# Patient Record
Sex: FEMALE | Race: WHITE | Employment: OTHER | URBAN - METROPOLITAN AREA
[De-identification: names, ages, dates, MRNs, and addresses within clinical notes are randomized per-mention and may not be internally consistent; named-entity substitution may affect disease eponyms.]

---

## 2017-01-15 ENCOUNTER — OFFICE VISIT (OUTPATIENT)
Dept: URGENT CARE | Facility: URGENT CARE | Age: 62
End: 2017-01-15
Payer: COMMERCIAL

## 2017-01-15 VITALS
TEMPERATURE: 98.3 F | WEIGHT: 180 LBS | BODY MASS INDEX: 29.81 KG/M2 | OXYGEN SATURATION: 92 % | HEART RATE: 109 BPM | DIASTOLIC BLOOD PRESSURE: 76 MMHG | SYSTOLIC BLOOD PRESSURE: 147 MMHG

## 2017-01-15 DIAGNOSIS — R82.90 NONSPECIFIC FINDING ON EXAMINATION OF URINE: ICD-10-CM

## 2017-01-15 DIAGNOSIS — R30.0 DYSURIA: Primary | ICD-10-CM

## 2017-01-15 DIAGNOSIS — N30.00 ACUTE CYSTITIS WITHOUT HEMATURIA: ICD-10-CM

## 2017-01-15 LAB
ALBUMIN UR-MCNC: 30 MG/DL
APPEARANCE UR: ABNORMAL
BACTERIA #/AREA URNS HPF: ABNORMAL /HPF
BILIRUB UR QL STRIP: NEGATIVE
COLOR UR AUTO: YELLOW
GLUCOSE UR STRIP-MCNC: NEGATIVE MG/DL
HGB UR QL STRIP: ABNORMAL
KETONES UR STRIP-MCNC: 15 MG/DL
LEUKOCYTE ESTERASE UR QL STRIP: ABNORMAL
NITRATE UR QL: NEGATIVE
NON-SQ EPI CELLS #/AREA URNS LPF: ABNORMAL /LPF
PH UR STRIP: 5.5 PH (ref 5–7)
RBC #/AREA URNS AUTO: ABNORMAL /HPF (ref 0–2)
SP GR UR STRIP: >1.03 (ref 1–1.03)
URN SPEC COLLECT METH UR: ABNORMAL
UROBILINOGEN UR STRIP-ACNC: 0.2 EU/DL (ref 0.2–1)
WBC #/AREA URNS AUTO: ABNORMAL /HPF (ref 0–2)

## 2017-01-15 PROCEDURE — 87086 URINE CULTURE/COLONY COUNT: CPT | Performed by: PHYSICIAN ASSISTANT

## 2017-01-15 PROCEDURE — 81001 URINALYSIS AUTO W/SCOPE: CPT | Performed by: PHYSICIAN ASSISTANT

## 2017-01-15 PROCEDURE — 87088 URINE BACTERIA CULTURE: CPT | Performed by: PHYSICIAN ASSISTANT

## 2017-01-15 PROCEDURE — 87186 SC STD MICRODIL/AGAR DIL: CPT | Performed by: PHYSICIAN ASSISTANT

## 2017-01-15 PROCEDURE — 99213 OFFICE O/P EST LOW 20 MIN: CPT | Performed by: PHYSICIAN ASSISTANT

## 2017-01-15 RX ORDER — CIPROFLOXACIN 500 MG/1
500 TABLET, FILM COATED ORAL 2 TIMES DAILY
Qty: 14 TABLET | Refills: 0 | Status: SHIPPED | OUTPATIENT
Start: 2017-01-15 | End: 2019-04-16

## 2017-01-15 NOTE — NURSING NOTE
"Chief Complaint   Patient presents with     Urinary Problem     Pt c/o urinary problem for one week.        Initial /76 mmHg  Pulse 109  Temp(Src) 98.3  F (36.8  C) (Oral)  Wt 180 lb (81.647 kg)  SpO2 92%  Breastfeeding? No Estimated body mass index is 29.81 kg/(m^2) as calculated from the following:    Height as of 12/14/16: 5' 5.16\" (1.655 m).    Weight as of this encounter: 180 lb (81.647 kg).  BP completed using cuff size: regular    Paola Klein CMA (AAMA)      "

## 2017-01-15 NOTE — PROGRESS NOTES
SUBJECTIVE:                                                    Carito Petty is a 61 year old female who presents to clinic today for the following health issues:    URINARY TRACT SYMPTOMS      Duration: one week ago. Sx were mild and have progressed. Sx worsened again last night.     Description  dysuria, frequency and urgency    Intensity:  moderate    Accompanying signs and symptoms:  Fever/chills: no   Flank pain no   Nausea and vomiting: no   Vaginal symptoms: none  Abdominal/Pelvic Pain: no     History  History of frequent UTI's: YES but it's been about a year since her last one.   History of kidney stones: no   Sexually Active: no   Possibility of pregnancy: No    Precipitating or alleviating factors: None    Therapies tried and outcome: cranberry juice, increased water intake Outcome: no relief.           Allergies   Allergen Reactions     Penicillins        Past Medical History   Diagnosis Date     Arthritis      PMDD (premenstrual dysphoric disorder)      depresion     Neuropathy (H)      extremities     Fibrocystic breast      Fibromyalgia      Seborrheic dermatitis 2008     face, scalp     Depressive disorder          Current Outpatient Prescriptions on File Prior to Visit:  gabapentin (NEURONTIN) 300 MG capsule TK 2 CS PO QD   venlafaxine (EFFEXOR) 37.5 MG tablet TK 1 T PO BID   ibuprofen (ADVIL,MOTRIN) 600 MG tablet Take 1 tablet (600 mg) by mouth every 6 hours as needed for moderate pain   Methocarbamol (ROBAXIN-750 PO) Take  by mouth. As needed   tramadol (ULTRAM) 50 MG tablet Take 50 mg by mouth every 6 hours as needed.   Omega-3 Fatty Acids (FISH OIL PO) Take  by mouth daily.   MULTI-VITAMIN OR TABS 1 TABLET DAILY     No current facility-administered medications on file prior to visit.    Social History   Substance Use Topics     Smoking status: Former Smoker -- 0.50 packs/day for 3 years     Types: Cigarettes     Start date: 06/01/1984     Quit date: 08/01/1987     Smokeless tobacco: Never  Used     Alcohol Use: Yes       ROS:  General: negative for fever  ABD: Denies abd pain  : as above    OBJECTIVE:  /76 mmHg  Pulse 109  Temp(Src) 98.3  F (36.8  C) (Oral)  Wt 180 lb (81.647 kg)  SpO2 92%  Breastfeeding? No   General:   awake, alert, and cooperative.  NAD.   Head: Normocephalic, atraumatic.  Eyes: Conjunctiva clear, non icteric.   ABD: soft, no tenderness to palpation , no rigidity, guarding or rebound . No CVAT  Neuro: Alert and oriented - normal speech.   Results for orders placed or performed in visit on 01/15/17   *UA reflex to Microscopic and Culture ([facility], [place] (except [place] and [place])   Result Value Ref Range    Color Urine Yellow     Appearance Urine Slightly Cloudy     Glucose Urine Negative NEG mg/dL    Bilirubin Urine Negative NEG    Ketones Urine 15 (A) NEG mg/dL    Specific Gravity Urine >1.030 1.003 - 1.035    Blood Urine Moderate (A) NEG    pH Urine 5.5 5.0 - 7.0 pH    Protein Albumin Urine 30 (A) NEG mg/dL    Urobilinogen Urine 0.2 0.2 - 1.0 EU/dL    Nitrite Urine Negative NEG    Leukocyte Esterase Urine Trace (A) NEG    Source Midstream Urine    Urine Microscopic   Result Value Ref Range    WBC Urine 25-50 (A) 0 - 2 /HPF    RBC Urine 25-50 (A) 0 - 2 /HPF    Squamous Epithelial /LPF Urine Few FEW /LPF    Bacteria Urine Many (A) NEG /HPF       ASSESSMENT:  Lower, uncomplicated urinary tract infection. Benign exam.      ICD-10-CM    1. Dysuria R30.0 *UA reflex to Microscopic and Culture ([facility], [place] (except [place] and [place])     Urine Microscopic   2. Nonspecific finding on examination of urine R82.90 Urine Culture Aerobic Bacterial   3. Acute cystitis without hematuria N30.00 ciprofloxacin (CIPRO) 500 MG tablet       PLAN:   As per ordered above.  Drink plenty of fluids.  Prevention and treatment of UTI's discussed. Follow up with primary care physician if not improving.  Advised about symptoms which might  lea more serious problems.      Manisha Castle PA-C

## 2017-01-17 LAB
BACTERIA SPEC CULT: ABNORMAL
MICRO REPORT STATUS: ABNORMAL
MICROORGANISM SPEC CULT: ABNORMAL
SPECIMEN SOURCE: ABNORMAL

## 2017-01-18 ENCOUNTER — OFFICE VISIT (OUTPATIENT)
Dept: DERMATOLOGY | Facility: CLINIC | Age: 62
End: 2017-01-18
Payer: COMMERCIAL

## 2017-01-18 DIAGNOSIS — L81.4 SOLAR LENTIGO: Primary | ICD-10-CM

## 2017-01-18 DIAGNOSIS — L82.0 INFLAMED SEBORRHEIC KERATOSIS: ICD-10-CM

## 2017-01-18 DIAGNOSIS — L82.1 SEBORRHEIC KERATOSIS: ICD-10-CM

## 2017-01-18 DIAGNOSIS — D22.5 MULTIPLE BENIGN NEVI OF UPPER AND LOWER EXTREMITIES, AND TRUNK: ICD-10-CM

## 2017-01-18 DIAGNOSIS — D22.60 MULTIPLE BENIGN NEVI OF UPPER AND LOWER EXTREMITIES, AND TRUNK: ICD-10-CM

## 2017-01-18 DIAGNOSIS — D22.70 MULTIPLE BENIGN NEVI OF UPPER AND LOWER EXTREMITIES, AND TRUNK: ICD-10-CM

## 2017-01-18 DIAGNOSIS — L57.0 ACTINIC KERATOSES: ICD-10-CM

## 2017-01-18 PROCEDURE — 17003 DESTRUCT PREMALG LES 2-14: CPT | Performed by: DERMATOLOGY

## 2017-01-18 PROCEDURE — 99214 OFFICE O/P EST MOD 30 MIN: CPT | Mod: 25 | Performed by: DERMATOLOGY

## 2017-01-18 PROCEDURE — 17110 DESTRUCTION B9 LES UP TO 14: CPT | Performed by: DERMATOLOGY

## 2017-01-18 PROCEDURE — 17000 DESTRUCT PREMALG LESION: CPT | Mod: 59 | Performed by: DERMATOLOGY

## 2017-01-18 NOTE — PATIENT INSTRUCTIONS
Cryotherapy    What is it?    Use of a very cold liquid, such as liquid nitrogen, to freeze and destroy abnormal skin cells that need to be removed    What should I expect?    Tenderness and redness    A small blister that might grow and fill with dark purple blood. There may be crusting.    More than one treatment may be needed if the lesions do not go away.    How do I care for the treated area?    Gently wash the area with your hands when bathing.    Use a thin layer of Vaseline to help with healing. You may use a Band-Aid.     The area should heal within 7-10 days and may leave behind a pink or lighter color.     Do not use an antibiotic or Neosporin ointment.     You may take acetaminophen (Tylenol) for pain.     Call your Doctor if you have:    Severe pain    Signs of infection (warmth, redness, cloudy yellow drainage, and or a bad smell)    Questions or concerns    Who should I call with questions?       [facility]: [phone]       [facility]: [phone]       For urgent needs outside of business hours call the [facility] at [phone]        and ask for the dermatology resident on call

## 2017-01-18 NOTE — PROGRESS NOTES
"[facility] Dermatology Note      Dermatology Problem List:  1. Actinic keratosis X2  2. Inflamed Seborrheic keratosis X2  3. Non irritating Seborrheic keratosis X4  -s/p treated with Cryotherapy     Encounter Date: Jan 18, 2017    CC:  Chief Complaint   Patient presents with     Skin Check     no areas of concern     Skin Tags     around eyes     Derm Problem     occassional skin rash on upper body         History of Present Illness:  Ms. Carito Petty is a 61 year old female who presents for evaluation of \"warts/growth\" around eyes and occasional skin rash. Today, the pt reports she has crusty spots around her eyes that are occasional pruritic. Sometimes when she rubs her eyes it would irritate the bumps. In addition, pt is concerned for an itchy rash mainly on upper body. She says they come and go and she doesn't have any lesions right now. When the rash is present it is dry and scaly in appearance. She has a hx of fibromyalgia wondered whether it is connected to the rash. She finds that rash slowly improves with use of a thick moisturizer. Pt is otherwise feeling well with no additional skin concerns.     Past Medical History:   Patient Active Problem List   Diagnosis     CARDIOVASCULAR SCREENING; LDL GOAL LESS THAN 160     Advanced directives, counseling/discussion     Urgency incontinence     Primary osteoarthritis of both hands     Fibromyalgia     Overweight (BMI 25.0-29.9)     Past Medical History   Diagnosis Date     Arthritis      PMDD (premenstrual dysphoric disorder)      depresion     Neuropathy (H)      extremities     Fibrocystic breast      Fibromyalgia      Seborrheic dermatitis 2008     face, scalp     Depressive disorder      Past Surgical History   Procedure Laterality Date     Head & neck surgery       2003 herniated disc and bone spurs     Colonoscopy  12/5/2013     Procedure: COLONOSCOPY;  colonoscopy screening;  Surgeon: Dennis Minaya MD;  Location:  OR "       Social History:  The patient is retired. The patient admits to use of tanning beds. She drinks 3-6 alcoholic beverages a week and is a nonsmoker.    Family History:  There is no family history of skin cancer. There is a family hx of Hay Fever.    Medications:  Current Outpatient Prescriptions   Medication Sig Dispense Refill     ciprofloxacin (CIPRO) 500 MG tablet Take 1 tablet (500 mg) by mouth 2 times daily 14 tablet 0     gabapentin (NEURONTIN) 300 MG capsule TK 2 CS PO QD  1     venlafaxine (EFFEXOR) 37.5 MG tablet TK 1 T PO BID  11     ibuprofen (ADVIL,MOTRIN) 600 MG tablet Take 1 tablet (600 mg) by mouth every 6 hours as needed for moderate pain 30 tablet 3     Methocarbamol (ROBAXIN-750 PO) Take  by mouth. As needed       tramadol (ULTRAM) 50 MG tablet Take 50 mg by mouth every 6 hours as needed.       Omega-3 Fatty Acids (FISH OIL PO) Take  by mouth daily.       MULTI-VITAMIN OR TABS 1 TABLET DAILY         Allergies   Allergen Reactions     Penicillins        Review of Systems:  -Skin/Heme New Pt: The patient denies frequent sun exposure. The patient denies excessive scarring or problems healing except as per HPI. The patient denies excessive bleeding. The pt admits to blistering sunburns.   -Constitutional: The patient is otherwise feeling well.     Physical exam:  Vitals: There were no vitals taken for this visit.  GEN: This is a well-nourished, well developed female in no acute distress  NEURO: Alert and oriented  PSYCH: in a pleasant mood, appropriate affect  SKIN: Total skin excluding the undergarment areas was performed. The exam included the head/face, neck, both arms, chest, back, abdomen, both legs, digits and/or nails.   -Poorly demarcated eczematous appearing papule on left popliteal fossa and left arm  -There are a number of both inflamed and non inflamed SK on the upper and lower eyelid with the more inflamed ones having a red rim on the lower eyelid  - There are an erythematous macule  with overyling adherent scale on the face x 2.  -Multiple regular brown pigmented macules and papules are identified on the body, scalp.   -Scattered brown macules on sun exposed areas.  -No other lesions of concern on areas examined.       Impression/Plan:  1. Actinic keratosis X2: cryotherapy    Sun precaution was advised including the use of sun screens of SPF 30 or higher, sun protective clothing, and avoidance of tanning beds.  2. Inflamed Seborrheic keratosis X2 on the b/l lower eyelids. cryo  3. Non-inflamed Seborrheic keratosis X4 on b/l eyelids: cryo.    Cryotherapy procedure note: After verbal consent and discussion of risks and benefits including but no limited to dyspigmentation/scar, blister, and pain, 2 inflamed SK and 4 non inflamed SK and 2 AK for a total of 8 lesions were treated with 1-2mm freeze border for 2 cycles with liquid nitrogen. Post cryotherapy instructions were provided.     4. Multiple clinically benign nevi scattered on the body    Benign nature was discussed. No further intervention required at this time.   5. Solar lentigines    Sun precaution was advised including the use of sun screens of SPF 30 or higher, sun protective clothing, and avoidance of tanning beds.      Follow-up in 2 years, earlier for new or changing lesions.       Staff Involved:  Scribe/Staff      Scribe Disclosure:   I, Yves Hastings, am serving as a scribe to document services personally performed by Dr. James To, based on data collection and the provider's statements to me.     Provider Disclosure:   I have reviewed the documentation recorded by the scribe and have edited it as needed. I have personally performed the services documented here and the documentation accurately represents those services and the decisions made by me.     James To MD, MS    Department of Dermatology  [facility]: Phone: [phone],  Fax:[phone]  [facility]: Phone: [phone], Fax: [phone]

## 2017-01-18 NOTE — NURSING NOTE
Dermatology Rooming Note    Carito Petty's goals for this visit include:   Chief Complaint   Patient presents with     Skin Check     no areas of concern     Skin Tags     around eyes       Is a scribe okay for this visit:YES    Are records needed for this visit(If yes, obtain release of information): No     Vitals: There were no vitals taken for this visit.    Referring Provider:  Referred Self, MD  No address on file    Bambi Harris LPN

## 2017-01-18 NOTE — Clinical Note
"1/18/2017       RE: Carito Petty  [address]  [place] [facility] [place] [address]     Dear Colleague,    Thank you for referring your patient, Carito Petty, to the [facility]. Please see a copy of my visit note below.    [facility] Dermatology Note      Dermatology Problem List:  1. Actinic keratosis X2  2. Inflamed Seborrheic keratosis X2  3. Non irritating Seborrheic keratosis X4  -s/p treated with Cryotherapy     Encounter Date: Jan 18, 2017    CC:  Chief Complaint   Patient presents with     Skin Check     no areas of concern     Skin Tags     around eyes     Derm Problem     occassional skin rash on upper body         History of Present Illness:  Ms. Carito Petty is a 61 year old female who presents for evaluation of \"warts/growth\" around eyes and occasional skin rash. Today, the pt reports she has crusty spots around her eyes that are occasional pruritic. Sometimes when she rubs her eyes it would irritate the bumps. In addition, pt is concerned for an itchy rash mainly on upper body. She says they come and go and she doesn't have any lesions right now. When the rash is present it is dry and scaly in appearance. She has a hx of fibromyalgia wondered whether it is connected to the rash. She finds that rash slowly improves with use of a thick moisturizer. Pt is otherwise feeling well with no additional skin concerns.     Past Medical History:   Patient Active Problem List   Diagnosis     CARDIOVASCULAR SCREENING; LDL GOAL LESS THAN 160     Advanced directives, counseling/discussion     Urgency incontinence     Primary osteoarthritis of both hands     Fibromyalgia     Overweight (BMI 25.0-29.9)     Past Medical History   Diagnosis Date     Arthritis      PMDD (premenstrual dysphoric disorder)      depresion     Neuropathy (H)      extremities     Fibrocystic breast      Fibromyalgia      Seborrheic dermatitis 2008     " face, scalp     Depressive disorder      Past Surgical History   Procedure Laterality Date     Head & neck surgery       2003 herniated disc and bone spurs     Colonoscopy  12/5/2013     Procedure: COLONOSCOPY;  colonoscopy screening;  Surgeon: Dennis Minaya MD;  Location:  OR       Social History:  The patient is retired. The patient admits to use of tanning beds. She drinks 3-6 alcoholic beverages a week and is a nonsmoker.    Family History:  There is no family history of skin cancer. There is a family hx of Hay Fever.    Medications:  Current Outpatient Prescriptions   Medication Sig Dispense Refill     ciprofloxacin (CIPRO) 500 MG tablet Take 1 tablet (500 mg) by mouth 2 times daily 14 tablet 0     gabapentin (NEURONTIN) 300 MG capsule TK 2 CS PO QD  1     venlafaxine (EFFEXOR) 37.5 MG tablet TK 1 T PO BID  11     ibuprofen (ADVIL,MOTRIN) 600 MG tablet Take 1 tablet (600 mg) by mouth every 6 hours as needed for moderate pain 30 tablet 3     Methocarbamol (ROBAXIN-750 PO) Take  by mouth. As needed       tramadol (ULTRAM) 50 MG tablet Take 50 mg by mouth every 6 hours as needed.       Omega-3 Fatty Acids (FISH OIL PO) Take  by mouth daily.       MULTI-VITAMIN OR TABS 1 TABLET DAILY         Allergies   Allergen Reactions     Penicillins        Review of Systems:  -Skin/Heme New Pt: The patient denies frequent sun exposure. The patient denies excessive scarring or problems healing except as per HPI. The patient denies excessive bleeding. The pt admits to blistering sunburns.   -Constitutional: The patient is otherwise feeling well.     Physical exam:  Vitals: There were no vitals taken for this visit.  GEN: This is a well-nourished, well developed female in no acute distress  NEURO: Alert and oriented  PSYCH: in a pleasant mood, appropriate affect  SKIN: Total skin excluding the undergarment areas was performed. The exam included the head/face, neck, both arms, chest, back, abdomen, both legs, digits  and/or nails.   -Poorly demarcated eczematous appearing papule on left popliteal fossa and left arm  -There are a number of both inflamed and non inflamed SK on the upper and lower eyelid with the more inflamed ones having a red rim on the lower eyelid  - There are an erythematous macule with overyling adherent scale on the face x 2.  -Multiple regular brown pigmented macules and papules are identified on the body, scalp.   -Scattered brown macules on sun exposed areas.  -No other lesions of concern on areas examined.       Impression/Plan:  1. Actinic keratosis X2: cryotherapy    Sun precaution was advised including the use of sun screens of SPF 30 or higher, sun protective clothing, and avoidance of tanning beds.  2. Inflamed Seborrheic keratosis X2 on the b/l lower eyelids. cryo  3. Non-inflamed Seborrheic keratosis X4 on b/l eyelids: cryo.    Cryotherapy procedure note: After verbal consent and discussion of risks and benefits including but no limited to dyspigmentation/scar, blister, and pain, 2 inflamed SK and 4 non inflamed SK and 2 AK for a total of 8 lesions were treated with 1-2mm freeze border for 2 cycles with liquid nitrogen. Post cryotherapy instructions were provided.     4. Multiple clinically benign nevi scattered on the body    Benign nature was discussed. No further intervention required at this time.   5. Solar lentigines    Sun precaution was advised including the use of sun screens of SPF 30 or higher, sun protective clothing, and avoidance of tanning beds.      Follow-up in 2 years, earlier for new or changing lesions.       Staff Involved:  Scribe/Staff      Scribe Disclosure:   I, Yves Hastings, am serving as a scribe to document services personally performed by Dr. James To, based on data collection and the provider's statements to me.     Provider Disclosure:   I have reviewed the documentation recorded by the scribe and have edited it as needed. I have personally performed the services  documented here and the documentation accurately represents those services and the decisions made by me.     James To MD, MS    Department of Dermatology  [facility]: Phone: [phone], Fax:[phone]  [facility]: Phone: [phone], Fax: [phone]

## 2017-01-18 NOTE — MR AVS SNAPSHOT
After Visit Summary   1/18/2017    Carito Petty    MRN: [id number]           Patient Information     Date Of Birth          1955        Visit Information        Provider Department      1/18/2017 12:30 PM James To MD [facility]        Today's Diagnoses     Solar lentigo    -  1     Multiple benign nevi of upper and lower extremities, and trunk         Seborrheic keratosis         Inflamed seborrheic keratosis         Actinic keratoses           Care Instructions    Cryotherapy    What is it?    Use of a very cold liquid, such as liquid nitrogen, to freeze and destroy abnormal skin cells that need to be removed    What should I expect?    Tenderness and redness    A small blister that might grow and fill with dark purple blood. There may be crusting.    More than one treatment may be needed if the lesions do not go away.    How do I care for the treated area?    Gently wash the area with your hands when bathing.    Use a thin layer of Vaseline to help with healing. You may use a Band-Aid.     The area should heal within 7-10 days and may leave behind a pink or lighter color.     Do not use an antibiotic or Neosporin ointment.     You may take acetaminophen (Tylenol) for pain.     Call your Doctor if you have:    Severe pain    Signs of infection (warmth, redness, cloudy yellow drainage, and or a bad smell)    Questions or concerns    Who should I call with questions?       [facility]: [phone]       [facility]: [phone]       For urgent needs outside of business hours call the [facility] at [phone]        and ask for the dermatology resident on call        Follow-ups after your visit        Who to contact     If you have questions or need follow up information about today's clinic visit or your schedule please contact [facility] directly at [phone].  Normal or  non-critical lab and imaging results will be communicated to you by [employer], letter or phone within 4 business days after the clinic has received the results. If you do not hear from us within 7 days, please contact the clinic through [employer] or phone. If you have a critical or abnormal lab result, we will notify you by phone as soon as possible.  Submit refill requests through [employer] or call your pharmacy and they will forward the refill request to us. Please allow 3 business days for your refill to be completed.          Additional Information About Your Visit        [employer] Information     [employer] gives you secure access to your electronic health record. If you see a primary care provider, you can also send messages to your care team and make appointments. If you have questions, please call your primary care clinic.  If you do not have a primary care provider, please call [phone] and they will assist you.      [employer] is an electronic gateway that provides easy, online access to your medical records. With [employer], you can request a clinic appointment, read your test results, renew a prescription or communicate with your care team.     To access your existing account, please contact your [facility] or call [phone] for assistance.        Care EveryWhere ID     This is your Care EveryWhere ID. This could be used by other organizations to access your [place] medical records  [id number]         Blood Pressure from Last 3 Encounters:   01/15/17 147/76   12/14/16 126/70   11/08/16 138/86    Weight from Last 3 Encounters:   01/15/17 81.647 kg (180 lb)   12/14/16 80.967 kg (178 lb 8 oz)   11/09/16 81.194 kg (179 lb)              Today, you had the following     No orders found for display       Primary Care Provider Office Phone # Fax #    Teresita Butler PA-C [phone]       [facility] [address]  [facility] [address]        Thank you!      Thank you for choosing [facility]  for your care. Our goal is always to provide you with excellent care. Hearing back from our patients is one way we can continue to improve our services. Please take a few minutes to complete the written survey that you may receive in the mail after your visit with us. Thank you!             Your Updated Medication List - Protect others around you: Learn how to safely use, store and throw away your medicines at www.disposemymeds.org.          This list is accurate as of: 1/18/17 12:54 PM.  Always use your most recent med list.                   Brand Name Dispense Instructions for use    ciprofloxacin 500 MG tablet    CIPRO    14 tablet    Take 1 tablet (500 mg) by mouth 2 times daily       FISH OIL PO      Take  by mouth daily.       gabapentin 300 MG capsule    NEURONTIN     TK 2 CS PO QD       ibuprofen 600 MG tablet    ADVIL/MOTRIN    30 tablet    Take 1 tablet (600 mg) by mouth every 6 hours as needed for moderate pain       Multi-vitamin Tabs tablet   Generic drug:  multivitamin, therapeutic with minerals      1 TABLET DAILY       ROBAXIN-750 PO      Take  by mouth. As needed       traMADol 50 MG tablet    ULTRAM     Take 50 mg by mouth every 6 hours as needed.       venlafaxine 37.5 MG tablet    EFFEXOR     TK 1 T PO BID

## 2017-01-18 NOTE — Clinical Note
"1/18/2017      RE: Carito Petty  [address]  [place] [facility] [address]       Formerly [facility] Dermatology Note      Dermatology Problem List:  1. Actinic keratosis X2  2. Inflamed Seborrheic keratosis X2  3. Non irritating Seborrheic keratosis X4  -s/p treated with Cryotherapy     Encounter Date: Jan 18, 2017    CC:  Chief Complaint   Patient presents with     Skin Check     no areas of concern     Skin Tags     around eyes     Derm Problem     occassional skin rash on upper body         History of Present Illness:  Ms. Carito Petty is a 61 year old female who presents for evaluation of \"warts/growth\" around eyes and occasional skin rash. Today, the pt reports she has crusty spots around her eyes that are occasional pruritic. Sometimes when she rubs her eyes it would irritate the bumps. In addition, pt is concerned for an itchy rash mainly on upper body. She says they come and go and she doesn't have any lesions right now. When the rash is present it is dry and scaly in appearance. She has a hx of fibromyalgia wondered whether it is connected to the rash. She finds that rash slowly improves with use of a thick moisturizer. Pt is otherwise feeling well with no additional skin concerns.     Past Medical History:   Patient Active Problem List   Diagnosis     CARDIOVASCULAR SCREENING; LDL GOAL LESS THAN 160     Advanced directives, counseling/discussion     Urgency incontinence     Primary osteoarthritis of both hands     Fibromyalgia     Overweight (BMI 25.0-29.9)     Past Medical History   Diagnosis Date     Arthritis      PMDD (premenstrual dysphoric disorder)      depresion     Neuropathy (H)      extremities     Fibrocystic breast      Fibromyalgia      Seborrheic dermatitis 2008     face, scalp     Depressive disorder      Past Surgical History   Procedure Laterality Date     Head & neck surgery       2003 herniated disc and bone spurs     Colonoscopy  12/5/2013     Procedure: " COLONOSCOPY;  colonoscopy screening;  Surgeon: Dennis Minaya MD;  Location:  OR       Social History:  The patient is retired. The patient admits to use of tanning beds. She drinks 3-6 alcoholic beverages a week and is a nonsmoker.    Family History:  There is no family history of skin cancer. There is a family hx of Hay Fever.    Medications:  Current Outpatient Prescriptions   Medication Sig Dispense Refill     ciprofloxacin (CIPRO) 500 MG tablet Take 1 tablet (500 mg) by mouth 2 times daily 14 tablet 0     gabapentin (NEURONTIN) 300 MG capsule TK 2 CS PO QD  1     venlafaxine (EFFEXOR) 37.5 MG tablet TK 1 T PO BID  11     ibuprofen (ADVIL,MOTRIN) 600 MG tablet Take 1 tablet (600 mg) by mouth every 6 hours as needed for moderate pain 30 tablet 3     Methocarbamol (ROBAXIN-750 PO) Take  by mouth. As needed       tramadol (ULTRAM) 50 MG tablet Take 50 mg by mouth every 6 hours as needed.       Omega-3 Fatty Acids (FISH OIL PO) Take  by mouth daily.       MULTI-VITAMIN OR TABS 1 TABLET DAILY         Allergies   Allergen Reactions     Penicillins        Review of Systems:  -Skin/Heme New Pt: The patient denies frequent sun exposure. The patient denies excessive scarring or problems healing except as per HPI. The patient denies excessive bleeding. The pt admits to blistering sunburns.   -Constitutional: The patient is otherwise feeling well.     Physical exam:  Vitals: There were no vitals taken for this visit.  GEN: This is a well-nourished, well developed female in no acute distress  NEURO: Alert and oriented  PSYCH: in a pleasant mood, appropriate affect  SKIN: Total skin excluding the undergarment areas was performed. The exam included the head/face, neck, both arms, chest, back, abdomen, both legs, digits and/or nails.   -Poorly demarcated eczematous appearing papule on left popliteal fossa and left arm  -There are a number of both inflamed and non inflamed SK on the upper and lower eyelid with the  more inflamed ones having a red rim on the lower eyelid  - There are an erythematous macule with overyling adherent scale on the face x 2.  -Multiple regular brown pigmented macules and papules are identified on the body, scalp.   -Scattered brown macules on sun exposed areas.  -No other lesions of concern on areas examined.       Impression/Plan:  1. Actinic keratosis X2: cryotherapy    Sun precaution was advised including the use of sun screens of SPF 30 or higher, sun protective clothing, and avoidance of tanning beds.  2. Inflamed Seborrheic keratosis X2 on the b/l lower eyelids. cryo  3. Non-inflamed Seborrheic keratosis X4 on b/l eyelids: cryo.    Cryotherapy procedure note: After verbal consent and discussion of risks and benefits including but no limited to dyspigmentation/scar, blister, and pain, 2 inflamed SK and 4 non inflamed SK and 2 AK for a total of 8 lesions were treated with 1-2mm freeze border for 2 cycles with liquid nitrogen. Post cryotherapy instructions were provided.     4. Multiple clinically benign nevi scattered on the body    Benign nature was discussed. No further intervention required at this time.   5. Solar lentigines    Sun precaution was advised including the use of sun screens of SPF 30 or higher, sun protective clothing, and avoidance of tanning beds.      Follow-up in 2 years, earlier for new or changing lesions.       Staff Involved:  Scribe/Staff      Scribe Disclosure:   I, Yves Hastings, am serving as a scribe to document services personally performed by Dr. James To, based on data collection and the provider's statements to me.     Provider Disclosure:   I have reviewed the documentation recorded by the scribe and have edited it as needed. I have personally performed the services documented here and the documentation accurately represents those services and the decisions made by me.     James To MD, MS    Department of Dermatology  [facility]  [facility]: Phone: [phone], Fax:[phone]  [facility]: Phone: [phone], Fax: [phone]

## 2017-08-18 ENCOUNTER — OFFICE VISIT (OUTPATIENT)
Dept: AUDIOLOGY | Facility: CLINIC | Age: 62
End: 2017-08-18
Payer: COMMERCIAL

## 2017-08-18 DIAGNOSIS — H90.A21 SENSORINEURAL HEARING LOSS, UNILATERAL, RIGHT EAR, WITH RESTRICTED HEARING ON THE CONTRALATERAL SIDE: Primary | ICD-10-CM

## 2017-08-18 PROCEDURE — 92557 COMPREHENSIVE HEARING TEST: CPT | Performed by: AUDIOLOGIST

## 2017-08-18 PROCEDURE — 99207 ZZC NO CHARGE LOS: CPT | Performed by: AUDIOLOGIST

## 2017-08-18 PROCEDURE — 92550 TYMPANOMETRY & REFLEX THRESH: CPT | Performed by: AUDIOLOGIST

## 2017-08-18 NOTE — MR AVS SNAPSHOT
After Visit Summary   8/18/2017    Carito Petty    MRN: [id number]           Patient Information     Date Of Birth          1955        Visit Information        Provider Department      8/18/2017 3:00 PM Kaci Olson [facility]        Today's Diagnoses     Sensorineural hearing loss, unilateral, right ear, with restricted hearing on the contralateral side    -  1       Follow-ups after your visit        Who to contact     If you have questions or need follow up information about today's clinic visit or your schedule please contact [facility] directly at [phone].  Normal or non-critical lab and imaging results will be communicated to you by REachhart, letter or phone within 4 business days after the clinic has received the results. If you do not hear from us within 7 days, please contact the clinic through REachhart or phone. If you have a critical or abnormal lab result, we will notify you by phone as soon as possible.  Submit refill requests through LiveGO or call your pharmacy and they will forward the refill request to us. Please allow 3 business days for your refill to be completed.          Additional Information About Your Visit        MyChart Information     LiveGO gives you secure access to your electronic health record. If you see a primary care provider, you can also send messages to your care team and make appointments. If you have questions, please call your primary care clinic.  If you do not have a primary care provider, please call [phone] and they will assist you.        Care EveryWhere ID     This is your Care EveryWhere ID. This could be used by other organizations to access your [facility] medical records  [id number]         Blood Pressure from Last 3 Encounters:   01/15/17 147/76   12/14/16 126/70   11/08/16 138/86    Weight from Last 3 Encounters:   01/15/17 180 lb (81.6 kg)   12/14/16 178 lb 8 oz (81 kg)   11/09/16 179 lb  (81.2 kg)              We Performed the Following     AUDIOGRAM/TYMPANOGRAM - INTERFACE     COMPREHENSIVE HEARING TEST     TYMPANOMETRY AND REFLEX THRESHOLD MEASUREMENTS        Primary Care Provider Office Phone # Fax #    Teresita Butler PA-C [phone]       [address]  [facility] [facility] [address]        Equal Access to Services     [facility]: Hadii aad ku hadasho Soomaali, waaxda luqadaha, qaybta kaalmada adeegyada, waxay idiin hayaan adeeg kharash la'aan . So [facility] [phone].    ATENCIÓN: Si habla español, tiene a ashley disposición servicios gratuitos de asistencia lingüística. Llame al [phone].    We comply with applicable federal civil rights laws and [place] laws. We do not discriminate on the basis of race, color, national origin, age, disability sex, sexual orientation or gender identity.            Thank you!     Thank you for choosing [facility]  for your care. Our goal is always to provide you with excellent care. Hearing back from our patients is one way we can continue to improve our services. Please take a few minutes to complete the written survey that you may receive in the mail after your visit with us. Thank you!             Your Updated Medication List - Protect others around you: Learn how to safely use, store and throw away your medicines at www.disposemymeds.org.          This list is accurate as of: 8/18/17  3:54 PM.  Always use your most recent med list.                   Brand Name Dispense Instructions for use Diagnosis    ciprofloxacin 500 MG tablet    CIPRO    14 tablet    Take 1 tablet (500 mg) by mouth 2 times daily    Acute cystitis without hematuria       FISH OIL PO      Take  by mouth daily.        gabapentin 300 MG capsule    NEURONTIN     TK 2 CS PO QD        ibuprofen 600 MG tablet    ADVIL/MOTRIN    30 tablet    Take 1 tablet (600 mg) by mouth every 6 hours as needed for moderate pain    Acute shoulder bursitis, left        Multi-vitamin Tabs tablet   Generic drug:  multivitamin, therapeutic with minerals      1 TABLET DAILY        ROBAXIN-750 PO      Take  by mouth. As needed        traMADol 50 MG tablet    ULTRAM     Take 50 mg by mouth every 6 hours as needed.        venlafaxine 37.5 MG tablet    EFFEXOR     TK 1 T PO BID

## 2017-08-18 NOTE — PROGRESS NOTES
AUDIOLOGY REPORT    SUBJECTIVE:  Carito Petty is a 62 year old female who was seen in the Audiology Clinic at the [facility] for audiologic evaluation, self-referred. The patient reports a difficulty hearing soft speech worse over the last 6-9 months, notices right ear worse than left.  Also difficulty following conversation in group situations. The patient denies  bilateral tinnitus, bilateral otalgia, bilateral drainage, bilateral aural fullness, history of noise exposure and vertigo.      OBJECTIVE:  Otoscopic exam indicates ears are clear of cerumen bilaterally     Pure Tone Thresholds assessed using conventional audiometry with good  reliability from 250-8000 Hz bilaterally using insert earphones     RIGHT:  mild high frequency sensorineural hearing loss    LEFT:    normal hearing from 250 to 4000 Hz with mild high frequency likely sensorineural hearing loss     Tympanogram:    RIGHT: normal eardrum mobility    LEFT:   normal eardrum mobility    Reflexes (reported by stimulus ear):  RIGHT: Ipsilateral is present at normal levels  RIGHT: Contralateral is present at normal levels  LEFT:   Ipsilateral is present at normal levels  LEFT:   Contralateral is present at normal levels      Speech Reception Threshold:    RIGHT: 15 dB HL    LEFT:   10 dB HL  Word Recognition Score:     RIGHT: 100% at 55 dB HL using NU-6 recorded word list.    LEFT:   96% at 50 dB HL using NU-6 recorded word list.      ASSESSMENT:   Asymmetric sensorineural hearing loss right ear worse than left ear. Today s results were discussed with the patient in detail.     PLAN:  Patient was counseled regarding hearing loss and impact on communication.  It is recommended that the patient follow up with ENT regarding mild asymmetric hearing loss in the last 6-9 months.  Recommended return for audiology evaluation in one year, sooner if further decreased in hearing are noted.  Please call this clinic with questions regarding these  results or recommendations.        Eliseo Forbes.  [place] Licensed Audiologist #[id number]

## 2018-02-18 ENCOUNTER — HEALTH MAINTENANCE LETTER (OUTPATIENT)
Age: 63
End: 2018-02-18

## 2018-05-17 ENCOUNTER — TELEPHONE (OUTPATIENT)
Dept: OTHER | Facility: CLINIC | Age: 63
End: 2018-05-17

## 2018-05-17 NOTE — TELEPHONE ENCOUNTER
5/17/2018    Call Regarding Onboarding [facility]     Attempt 1    Message on voicemail     Comments:      Outreach   SB

## 2018-06-08 NOTE — TELEPHONE ENCOUNTER
6/8/2018    Call Regarding Onboarding [facility] Choices    Attempt 2    Message on voicemail     Comments: 0 DEP      Outreach   CC

## 2018-08-07 NOTE — TELEPHONE ENCOUNTER
8/7/2018    Call Regarding Onboarding are Choices    Attempt 3    Message on voicemail     Comments:       Outreach   Minerva Manjarrez

## 2018-08-20 ENCOUNTER — VIRTUAL VISIT (OUTPATIENT)
Dept: FAMILY MEDICINE | Facility: OTHER | Age: 63
End: 2018-08-20

## 2018-08-20 NOTE — PROGRESS NOTES
"Date:   Clinician: Kamryn Augustin  Clinician NPI: [id number]  Patient: Carito Petty  Patient : 1955  Patient Address: [address] [place] [address]  Patient Phone: [phone]  Visit Protocol: UTI  Patient Summary:  Carito is a 63 year old ( : 1955 ) female who initiated a Visit for a presumed bladder infection. When asked the question \"Please sign me up to receive news, health information and promotions. \", Carito responded \"No\".    Her symptoms began 2 days ago and consist of dysuria, urgency, urinary incontinence, and urinary frequency.   Symptom Details   Urinary Frequency: Several times each hour    She denies foul smelling urine, nausea, hesitation, hematuria, vaginal discharge, abdominal pain, recent antibiotic use, chills, vomiting, loss of appetite, feeling feverish, and flank pain. Carito has never had kidney stones. She has not been hospitalized, been a patient in a nursing home, or had a catheter in the past two weeks. She denies risk factors for a sexually transmitted disease.  Carito has not had any UTIs in the past 12 months. Her current symptoms are similar to the previous UTI symptoms. She took an antibiotic for her last infection but does not remember which one.   Carito does not get yeast infections when she takes antibiotics.   She denies pregnancy and denies breastfeeding. She does not menstruate.   Additional information as reported by the patient (free text): I have pain in my buttock area,but have been constipated.   MEDICATIONS: tramadol oral, gabapentin oral, venlafaxine oral, ALLERGIES: Penicillins  Clinician Response:  Dear Carito,  Based on the information you have provided, you likely have a bladder infection, also called acute urinary tract infection (UTI).   To treat your infection, I am prescribing:   Sulfamethoxazole-trimethoprim (Bactrim DS) 800-160 mg oral tablet. Take 1 tablet by mouth every 12 hours for 3 days. Continue taking the " tablets even if you feel better before all of the medication is gone. There are no refills with this prescription.  Some women may develop a yeast infection as a side effect of taking antibiotics. If you notice symptoms of a yeast infection, [facility] can help treat that condition as well. Simply log in and complete another Visit, which will cover all of the necessary questions to determine the best treatment for you.   Some people develop allergies to antibiotics. If you notice a new rash, significant swelling, or difficulty breathing, stop the medication immediately and go into a clinic for physical evaluation.   If you become pregnant during this course of treatment, stop taking the medication and contact your primary care provider.   To help treat your current UTI and prevent future occurrences, remember to:     Drink 8-10, 8-ounce glasses of water daily.    Urinate after sexual intercourse.    Wipe front to back after using the bathroom.     You should visit a clinic for a follow-up visit if your symptoms do not improve in 1-2 days or if you experience another urinary tract infection soon after completing this treatment.   Diagnosis: Acute uncomplicated bladder infection  Diagnosis ICD: N39.0  Prescription: sulfamethoxazole-trimethoprim (Bactrim DS) 800-160 mg oral tablet 6 tablet, 3 days supply. Take 1 tablet by mouth every 12 hours for 3 days. Refills: 0, Refill as needed: no, Allow substitutions: yes  Pharmacy: [id number] - [phone] [address] [place] [address]

## 2018-10-15 ENCOUNTER — TELEPHONE (OUTPATIENT)
Dept: DERMATOLOGY | Facility: CLINIC | Age: 63
End: 2018-10-15

## 2018-10-15 NOTE — TELEPHONE ENCOUNTER
----- Message from Dean Melo MA sent at 1/18/2017 12:53 PM CST -----  2 year skin check due 1/18/19

## 2018-10-15 NOTE — TELEPHONE ENCOUNTER
Left message for patient to call [facility] in [place] back at [phone]    Bambi Villavicencio LPN

## 2019-01-22 ENCOUNTER — OFFICE VISIT (OUTPATIENT)
Dept: DERMATOLOGY | Facility: CLINIC | Age: 64
End: 2019-01-22
Payer: COMMERCIAL

## 2019-01-22 DIAGNOSIS — L82.0 INFLAMED SEBORRHEIC KERATOSIS: ICD-10-CM

## 2019-01-22 DIAGNOSIS — D22.9 MULTIPLE BENIGN NEVI: ICD-10-CM

## 2019-01-22 DIAGNOSIS — L81.4 SOLAR LENTIGO: Primary | ICD-10-CM

## 2019-01-22 DIAGNOSIS — D23.9 DERMATOFIBROMA: ICD-10-CM

## 2019-01-22 DIAGNOSIS — L57.8 SUN-DAMAGED SKIN: ICD-10-CM

## 2019-01-22 DIAGNOSIS — L82.1 SEBORRHEIC KERATOSIS: ICD-10-CM

## 2019-01-22 PROCEDURE — 17110 DESTRUCTION B9 LES UP TO 14: CPT | Performed by: DERMATOLOGY

## 2019-01-22 PROCEDURE — 99214 OFFICE O/P EST MOD 30 MIN: CPT | Mod: 25 | Performed by: DERMATOLOGY

## 2019-01-22 ASSESSMENT — PAIN SCALES - GENERAL: PAINLEVEL: NO PAIN (0)

## 2019-01-22 NOTE — LETTER
1/22/2019         RE: Carito Petty  [address]  [place] [address]        Dear Colleague,    Thank you for referring your patient, Carito Petty, to the [facility]. Please see a copy of my visit note below.    [facility] Dermatology Note      Dermatology Problem List:  1. Actinic keratosis X2  2. Inflamed Seborrheic keratosis x 3 b/l eyelids - s/p cryotherapy    Encounter Date: Jan 22, 2019    CC:  Chief Complaint   Patient presents with     Skin Check     Pt states areas of concern around the eyes, and spots on back. No personal or family hx of SC.        History of Present Illness:  Ms. Carito Petty is a 63 year old female who presents for a skin check. She was last seen by Dr. To 1/18/2017 for a skin check. Patient has an area of concern around her eyes as well as spots on her back. She has had the spots around her eyes treated with cryotherapy in the past. Most resolved, one did not. She has developed new spots since. They are itchy, and can also get irritated easily. No personal or family history of skin cancer. No other concerns addressed today.      Past Medical History:   Patient Active Problem List   Diagnosis     CARDIOVASCULAR SCREENING; LDL GOAL LESS THAN 160     Advanced directives, counseling/discussion     Urgency incontinence     Primary osteoarthritis of both hands     Fibromyalgia     Overweight (BMI 25.0-29.9)     Past Medical History:   Diagnosis Date     Arthritis      Depressive disorder      Fibrocystic breast      Fibromyalgia      Neuropathy     extremities     PMDD (premenstrual dysphoric disorder)     depresion     Seborrheic dermatitis 2008    face, scalp     Past Surgical History:   Procedure Laterality Date     COLONOSCOPY  12/5/2013    Procedure: COLONOSCOPY;  colonoscopy screening;  Surgeon: Dennis Minaya MD;  Location: [facility] OR     HEAD & NECK SURGERY      2003 herniated disc and bone spurs       Social  History:  The patient is retired. 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 The patient admits to use of tanning beds. She drinks 3-6 alcoholic beverages a week and is a nonsmoker.    Family History:  There is no family history of skin cancer. There is a family hx of Hay Fever.     Medications:  Current Outpatient Medications   Medication Sig Dispense Refill     ciprofloxacin (CIPRO) 500 MG tablet Take 1 tablet (500 mg) by mouth 2 times daily 14 tablet 0     gabapentin (NEURONTIN) 300 MG capsule TK 2 CS PO QD  1     ibuprofen (ADVIL,MOTRIN) 600 MG tablet Take 1 tablet (600 mg) by mouth every 6 hours as needed for moderate pain 30 tablet 3     Methocarbamol (ROBAXIN-750 PO) Take  by mouth. As needed       MULTI-VITAMIN OR TABS 1 TABLET DAILY       Omega-3 Fatty Acids (FISH OIL PO) Take  by mouth daily.       tramadol (ULTRAM) 50 MG tablet Take 50 mg by mouth every 6 hours as needed.       venlafaxine (EFFEXOR) 37.5 MG tablet TK 1 T PO BID  11       Allergies   Allergen Reactions     Penicillins      Review of Systems:  -Constitutional: Patient is otherwise feeling well, in usual state of health.   -Skin: As above in HPI. No additional skin concerns.    Physical exam:  Vitals: There were no vitals taken for this visit.  GEN: This is a well-nourished, well developed female in no acute distress  PSYCH: in a pleasant mood, appropriate affect  SKIN: Total skin excluding the undergarment areas was performed. The exam included the head/face, neck, both arms, chest, back, abdomen, both legs, digits and/or nails and buttocks  - Sharma Type II  - Scattered brown macules on sun exposed areas.  - Irritated waxy stuck on papule on the right lower eyelid, left upper eyelid  - There is a firm tan/flesh colored papule that dimples with lateral pressure on the right upper arm.  - Multiple regular brown pigmented macules and papules are identified on the scalp, and trunk.   - There are waxy stuck on  tan to brown papules on the trunk, neck, and extremities.  - No other lesions of concern on areas examined.       Impression/Plan:  1. Sun damaged skin with solar lentigines    Recommend sunscreens SPF #30 or greater, protective clothing and avoidance of tanning beds.    2. Benign findings including: seborrheic keratoses, dermatofibroma    No further intervention required. Patient to report changes.     Patient reassured of the benign nature of these lesions.    3. Multiple benign nevi    Patient reassured of the benign nature of these lesions.    No further intervention required. Patient to report changes.       4. Inflamed Seborrheic keratosis X3 on the b/l lower eyelids.     Cryotherapy procedure note. After verbal consent and discussion of risks and benefits including but not limited to dyspigmentation/scar, blister, and pain. 3 inflamed SK were treated with 3 cycles of LN2 and a pick ups. Post cryotherapy instructions provided.     Recommended patient avoid rubbing her eyes as friction can contribute the the formation of these lesions.       Follow-up 2-3 years for skin exam, sooner for lesions of concern.       Staff Involved:  Scribe/Staff    Scribe Disclosure  I, Cathryn Pope, am serving as a scribe to document services personally performed by Dr. Leela Gomez MD, based on data collection and the provider's statements to me.     Provider Disclosure:   The documentation recorded by the scribe accurately reflects the services I personally performed and the decisions made by me.    Leela Gomez MD    Department of Dermatology  [facility]: Phone: [phone], Fax:[phone]  [facility]: Phone: [phone], Fax: [phone]                  Again, thank you for allowing me to participate in the care of your patient.        Sincerely,        Leela Gomez MD

## 2019-01-22 NOTE — NURSING NOTE
Carito Petty's goals for this visit include:   Chief Complaint   Patient presents with     Skin Check     Pt states areas of concern around the eyes, and spots on back. No personal or family hx of SC.      She requests these members of her care team be copied on today's visit information:     PCP: Teresita Butler    Referring Provider:  No referring provider defined for this encounter.    There were no vitals taken for this visit.    Do you need any medication refills at today's visit? No    Brea English LPN

## 2019-01-22 NOTE — PROGRESS NOTES
[facility] Dermatology Note      Dermatology Problem List:  1. Actinic keratosis X2  2. Inflamed Seborrheic keratosis x 3 b/l eyelids - s/p cryotherapy    Encounter Date: Jan 22, 2019    CC:  Chief Complaint   Patient presents with     Skin Check     Pt states areas of concern around the eyes, and spots on back. No personal or family hx of SC.        History of Present Illness:  Ms. Carito Petty is a 63 year old female who presents for a skin check. She was last seen by Dr. To 1/18/2017 for a skin check. Patient has an area of concern around her eyes as well as spots on her back. She has had the spots around her eyes treated with cryotherapy in the past. Most resolved, one did not. She has developed new spots since. They are itchy, and can also get irritated easily. No personal or family history of skin cancer. No other concerns addressed today.      Past Medical History:   Patient Active Problem List   Diagnosis     CARDIOVASCULAR SCREENING; LDL GOAL LESS THAN 160     Advanced directives, counseling/discussion     Urgency incontinence     Primary osteoarthritis of both hands     Fibromyalgia     Overweight (BMI 25.0-29.9)     Past Medical History:   Diagnosis Date     Arthritis      Depressive disorder      Fibrocystic breast      Fibromyalgia      Neuropathy     extremities     PMDD (premenstrual dysphoric disorder)     depresion     Seborrheic dermatitis 2008    face, scalp     Past Surgical History:   Procedure Laterality Date     COLONOSCOPY  12/5/2013    Procedure: COLONOSCOPY;  colonoscopy screening;  Surgeon: Dennis Minaya MD;  Location: [facility] OR     HEAD & NECK SURGERY      2003 herniated disc and bone spurs       Social History:  The patient is retired. 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 The patient admits to use of tanning beds. She drinks 3-6 alcoholic beverages a week and is a nonsmoker.    Family History:  There is no family history  of skin cancer. There is a family hx of Hay Fever.     Medications:  Current Outpatient Medications   Medication Sig Dispense Refill     ciprofloxacin (CIPRO) 500 MG tablet Take 1 tablet (500 mg) by mouth 2 times daily 14 tablet 0     gabapentin (NEURONTIN) 300 MG capsule TK 2 CS PO QD  1     ibuprofen (ADVIL,MOTRIN) 600 MG tablet Take 1 tablet (600 mg) by mouth every 6 hours as needed for moderate pain 30 tablet 3     Methocarbamol (ROBAXIN-750 PO) Take  by mouth. As needed       MULTI-VITAMIN OR TABS 1 TABLET DAILY       Omega-3 Fatty Acids (FISH OIL PO) Take  by mouth daily.       tramadol (ULTRAM) 50 MG tablet Take 50 mg by mouth every 6 hours as needed.       venlafaxine (EFFEXOR) 37.5 MG tablet TK 1 T PO BID  11       Allergies   Allergen Reactions     Penicillins      Review of Systems:  -Constitutional: Patient is otherwise feeling well, in usual state of health.   -Skin: As above in HPI. No additional skin concerns.    Physical exam:  Vitals: There were no vitals taken for this visit.  GEN: This is a well-nourished, well developed female in no acute distress  PSYCH: in a pleasant mood, appropriate affect  SKIN: Total skin excluding the undergarment areas was performed. The exam included the head/face, neck, both arms, chest, back, abdomen, both legs, digits and/or nails and buttocks  - Sharma Type II  - Scattered brown macules on sun exposed areas.  - Irritated waxy stuck on papule on the right lower eyelid, left upper eyelid  - There is a firm tan/flesh colored papule that dimples with lateral pressure on the right upper arm.  - Multiple regular brown pigmented macules and papules are identified on the scalp, and trunk.   - There are waxy stuck on tan to brown papules on the trunk, neck, and extremities.  - No other lesions of concern on areas examined.       Impression/Plan:  1. Sun damaged skin with solar lentigines    Recommend sunscreens SPF #30 or greater, protective clothing and avoidance of  tanning beds.    2. Benign findings including: seborrheic keratoses, dermatofibroma    No further intervention required. Patient to report changes.     Patient reassured of the benign nature of these lesions.    3. Multiple benign nevi    Patient reassured of the benign nature of these lesions.    No further intervention required. Patient to report changes.       4. Inflamed Seborrheic keratosis X3 on the b/l lower eyelids.     Cryotherapy procedure note. After verbal consent and discussion of risks and benefits including but not limited to dyspigmentation/scar, blister, and pain. 3 inflamed SK were treated with 3 cycles of LN2 and a pick ups. Post cryotherapy instructions provided.     Recommended patient avoid rubbing her eyes as friction can contribute the the formation of these lesions.       Follow-up 2-3 years for skin exam, sooner for lesions of concern.       Staff Involved:  Scribe/Staff    Scribe Disclosure  I, Cathryn Pope, am serving as a scribe to document services personally performed by Dr. Leela Gomez MD, based on data collection and the provider's statements to me.     Provider Disclosure:   The documentation recorded by the scribe accurately reflects the services I personally performed and the decisions made by me.    Leela Gomez MD    Department of Dermatology  [facility]: Phone: [phone], Fax:[phone]  [facility]: Phone: [phone], Fax: [phone]

## 2019-01-22 NOTE — PATIENT INSTRUCTIONS
Cryotherapy    What is it?    Use of a very cold liquid, such as liquid nitrogen, to freeze and destroy abnormal skin cells that need to be removed    What should I expect?    Tenderness and redness    A small blister that might grow and fill with dark purple blood. There may be crusting.    More than one treatment may be needed if the lesions do not go away.    How do I care for the treated area?    Gently wash the area with your hands when bathing.    Use a thin layer of Vaseline to help with healing. You may use a Band-Aid.     The area should heal within 7-10 days and may leave behind a pink or lighter color.     Do not use an antibiotic or Neosporin ointment.     You may take acetaminophen (Tylenol) for pain.     Call your Doctor if you have:    Severe pain    Signs of infection (warmth, redness, cloudy yellow drainage, and or a bad smell)    Questions or concerns    Who should I call with questions?       [facility]: [phone]       [facility]: [phone]       For urgent needs outside of business hours call the [facility] at [phone]        and ask for the dermatology resident on call

## 2019-02-15 ENCOUNTER — HEALTH MAINTENANCE LETTER (OUTPATIENT)
Age: 64
End: 2019-02-15

## 2019-04-16 ENCOUNTER — OFFICE VISIT (OUTPATIENT)
Dept: FAMILY MEDICINE | Facility: CLINIC | Age: 64
End: 2019-04-16
Payer: COMMERCIAL

## 2019-04-16 ENCOUNTER — ANCILLARY PROCEDURE (OUTPATIENT)
Dept: GENERAL RADIOLOGY | Facility: CLINIC | Age: 64
End: 2019-04-16
Attending: FAMILY MEDICINE
Payer: COMMERCIAL

## 2019-04-16 VITALS
HEART RATE: 99 BPM | WEIGHT: 176 LBS | DIASTOLIC BLOOD PRESSURE: 76 MMHG | TEMPERATURE: 98.4 F | SYSTOLIC BLOOD PRESSURE: 132 MMHG | BODY MASS INDEX: 29.32 KG/M2 | HEIGHT: 65 IN | OXYGEN SATURATION: 95 % | RESPIRATION RATE: 18 BRPM

## 2019-04-16 DIAGNOSIS — R06.2 WHEEZING: ICD-10-CM

## 2019-04-16 DIAGNOSIS — Z12.31 VISIT FOR SCREENING MAMMOGRAM: ICD-10-CM

## 2019-04-16 DIAGNOSIS — J20.9 ACUTE BRONCHITIS, UNSPECIFIED ORGANISM: Primary | ICD-10-CM

## 2019-04-16 DIAGNOSIS — J11.1 INFLUENZA-LIKE ILLNESS: ICD-10-CM

## 2019-04-16 DIAGNOSIS — Z12.11 SCREEN FOR COLON CANCER: ICD-10-CM

## 2019-04-16 LAB
ANION GAP SERPL CALCULATED.3IONS-SCNC: 6 MMOL/L (ref 3–14)
BUN SERPL-MCNC: 10 MG/DL (ref 7–30)
CALCIUM SERPL-MCNC: 9.7 MG/DL (ref 8.5–10.1)
CHLORIDE SERPL-SCNC: 105 MMOL/L (ref 94–109)
CO2 SERPL-SCNC: 28 MMOL/L (ref 20–32)
CREAT SERPL-MCNC: 0.69 MG/DL (ref 0.52–1.04)
ERYTHROCYTE [DISTWIDTH] IN BLOOD BY AUTOMATED COUNT: 14.9 % (ref 10–15)
GFR SERPL CREATININE-BSD FRML MDRD: >90 ML/MIN/{1.73_M2}
GLUCOSE SERPL-MCNC: 90 MG/DL (ref 70–99)
HCT VFR BLD AUTO: 44.2 % (ref 35–47)
HGB BLD-MCNC: 14.9 G/DL (ref 11.7–15.7)
MCH RBC QN AUTO: 30.2 PG (ref 26.5–33)
MCHC RBC AUTO-ENTMCNC: 33.7 G/DL (ref 31.5–36.5)
MCV RBC AUTO: 90 FL (ref 78–100)
PLATELET # BLD AUTO: 217 10E9/L (ref 150–450)
POTASSIUM SERPL-SCNC: 3.8 MMOL/L (ref 3.4–5.3)
RBC # BLD AUTO: 4.94 10E12/L (ref 3.8–5.2)
SODIUM SERPL-SCNC: 139 MMOL/L (ref 133–144)
WBC # BLD AUTO: 6.4 10E9/L (ref 4–11)

## 2019-04-16 PROCEDURE — 36415 COLL VENOUS BLD VENIPUNCTURE: CPT | Performed by: FAMILY MEDICINE

## 2019-04-16 PROCEDURE — 85027 COMPLETE CBC AUTOMATED: CPT | Performed by: FAMILY MEDICINE

## 2019-04-16 PROCEDURE — 99214 OFFICE O/P EST MOD 30 MIN: CPT | Performed by: FAMILY MEDICINE

## 2019-04-16 PROCEDURE — 71046 X-RAY EXAM CHEST 2 VIEWS: CPT

## 2019-04-16 PROCEDURE — 80048 BASIC METABOLIC PNL TOTAL CA: CPT | Performed by: FAMILY MEDICINE

## 2019-04-16 RX ORDER — AZITHROMYCIN 250 MG/1
TABLET, FILM COATED ORAL
Qty: 6 TABLET | Refills: 0 | Status: SHIPPED | OUTPATIENT
Start: 2019-04-16 | End: 2019-08-14

## 2019-04-16 RX ORDER — ALBUTEROL SULFATE 90 UG/1
2 AEROSOL, METERED RESPIRATORY (INHALATION) EVERY 6 HOURS PRN
Qty: 8.5 G | Refills: 0 | Status: SHIPPED | OUTPATIENT
Start: 2019-04-16 | End: 2023-10-31

## 2019-04-16 ASSESSMENT — MIFFLIN-ST. JEOR: SCORE: 1359.21

## 2019-04-16 ASSESSMENT — PAIN SCALES - GENERAL: PAINLEVEL: MODERATE PAIN (5)

## 2019-04-16 NOTE — PROGRESS NOTES
SUBJECTIVE:   Carito Petty is a 63 year old female who presents to clinic today for the following   health issues:    Acute Illness   Acute illness concerns: Possible flu or sinus infection, wheezing and chest pressure started 3 days ago. Really intense coughing at the start that is better.   Onset: 1 week    Fever: YES    Chills/Sweats: YES- sweats    Headache (location?): YES- frontal pressure pain    Sinus Pressure:no    Conjunctivitis:  no    Ear Pain: no    Rhinorrhea: no     Congestion: YES nasal and chest congestin-improving but still has pressure    Sore Throat: no      Cough: YES-productive of white/yellow sputum    Wheeze: YES    Decreased Appetite: YES    Nausea: no     Vomiting: no     Diarrhea:  no     Dysuria/Freq.: no     Fatigue/Achiness: YES    Sick/Strep Exposure: no      Therapies Tried and outcome: Robitussin, cough/congestion medication OTC          Additional history: as documented    Reviewed  and updated as needed this visit by clinical staff         Reviewed and updated as needed this visit by Provider         Patient Active Problem List   Diagnosis     CARDIOVASCULAR SCREENING; LDL GOAL LESS THAN 160     Advanced directives, counseling/discussion     Urgency incontinence     Primary osteoarthritis of both hands     Fibromyalgia     Overweight (BMI 25.0-29.9)     Past Surgical History:   Procedure Laterality Date     COLONOSCOPY  2013    Procedure: COLONOSCOPY;  colonoscopy screening;  Surgeon: Dennis Minaya MD;  Location: [facility] OR     HEAD & NECK SURGERY       herniated disc and bone spurs       Social History     Tobacco Use     Smoking status: Former Smoker     Packs/day: 0.50     Years: 3.00     Pack years: 1.50     Types: Cigarettes     Start date: 1984     Last attempt to quit: 1987     Years since quittin.7     Smokeless tobacco: Never Used   Substance Use Topics     Alcohol use: Yes     Family History   Problem Relation Age of Onset     Alcohol/Drug  Mother      Substance Abuse Mother      Allergies Father      Alcohol/Drug Father      Cancer Maternal Grandmother         cervical     Other Cancer Maternal Grandmother      Cancer Paternal Grandmother 68        breast     Breast Cancer Paternal Grandmother      Other Cancer Paternal Grandmother      Cancer Other      Other Cancer Other      Arthritis Sister      Anxiety Disorder Sister      Mental Illness Brother      Substance Abuse Brother      Substance Abuse Sister      Skin Cancer No family hx of          Current Outpatient Medications   Medication Sig Dispense Refill     albuterol (PROAIR HFA/PROVENTIL HFA/VENTOLIN HFA) 108 (90 Base) MCG/ACT inhaler Inhale 2 puffs into the lungs every 6 hours as needed for shortness of breath / dyspnea or wheezing 8.5 g 0     azithromycin (ZITHROMAX) 250 MG tablet Two tablets first day, then one tablet daily for four days. 6 tablet 0     gabapentin (NEURONTIN) 300 MG capsule TK 2 CS PO QD  1     ibuprofen (ADVIL,MOTRIN) 600 MG tablet Take 1 tablet (600 mg) by mouth every 6 hours as needed for moderate pain 30 tablet 3     Ipratropium-Albuterol (COMBIVENT RESPIMAT)  MCG/ACT inhaler Inhale 1 puff into the lungs 4 times daily as needed for shortness of breath / dyspnea or wheezing 4 g 1     Methocarbamol (ROBAXIN-750 PO) Take  by mouth. As needed       MULTI-VITAMIN OR TABS 1 TABLET DAILY       Omega-3 Fatty Acids (FISH OIL PO) Take  by mouth daily.       tramadol (ULTRAM) 50 MG tablet Take 50 mg by mouth every 6 hours as needed.       venlafaxine (EFFEXOR) 37.5 MG tablet TK 1 T PO BID  11     Allergies   Allergen Reactions     Penicillins      SOB, hives     Recent Labs   Lab Test 04/16/19  1411 09/27/14  0919 03/01/13  0818 09/16/11  1005   LDL  --  134* 134* 156*   HDL  --  53 55 47*   TRIG  --  148 175* 163*   ALT  --   --  30 26   CR 0.69  --  0.65  --    GFRESTIMATED >90  --  >90  --    GFRESTBLACK >90  --  >90  --    POTASSIUM 3.8  --  3.9  --       BP Readings  "from Last 3 Encounters:   04/16/19 132/76   01/15/17 147/76   12/14/16 126/70    Wt Readings from Last 3 Encounters:   04/16/19 79.8 kg (176 lb)   01/15/17 81.6 kg (180 lb)   12/14/16 81 kg (178 lb 8 oz)                  Labs reviewed in EPIC    ROS:  Constitutional, HEENT, cardiovascular, pulmonary, gi and gu systems are negative, except as otherwise noted.    OBJECTIVE:     /76 (BP Location: Left arm, Patient Position: Chair, Cuff Size: Adult Large)   Pulse 99   Temp 98.4  F (36.9  C) (Oral)   Resp 18   Ht 1.651 m (5' 5\")   Wt 79.8 kg (176 lb)   SpO2 95%   BMI 29.29 kg/m    Body mass index is 29.29 kg/m .  GENERAL: acutely ill, alert, well nourished, well hydrated, no distress, with cough  HENT: ear canals- normal; TMs- normal; Nose- rhinorrhea ; Mouth- no ulcers, no lesions, throat is erythematous without exudate. Sinuses without tenderness to percussion.   NECK: no tenderness, negative anterior cervical adenopathy, no asymmetry, no masses, no stiffness; thyroid- normal to palpation  RESP: lungs clear to auscultation - no rales, no rhonchi, some expiratory wheezes  CV: regular rates and rhythm, normal S1 S2, no S3 or S4 and no murmur, no click or rub -  ABDOMEN: soft, no tenderness, no  hepatosplenomegaly, no masses, normal bowel sounds  MS: extremities- no gross deformities noted, no edema  SKIN: no suspicious lesions, no rashes  PSYCH: Alert and oriented times 3; affect- normal     Diagnostic Test Results:  Results for orders placed or performed in visit on 04/16/19   CBC with platelets   Result Value Ref Range    WBC 6.4 4.0 - 11.0 10e9/L    RBC Count 4.94 3.8 - 5.2 10e12/L    Hemoglobin 14.9 11.7 - 15.7 g/dL    Hematocrit 44.2 35.0 - 47.0 %    MCV 90 78 - 100 fl    MCH 30.2 26.5 - 33.0 pg    MCHC 33.7 31.5 - 36.5 g/dL    RDW 14.9 10.0 - 15.0 %    Platelet Count 217 150 - 450 10e9/L   Basic metabolic panel   Result Value Ref Range    Sodium 139 133 - 144 mmol/L    Potassium 3.8 3.4 - 5.3 mmol/L    " "Chloride 105 94 - 109 mmol/L    Carbon Dioxide 28 20 - 32 mmol/L    Anion Gap 6 3 - 14 mmol/L    Glucose 90 70 - 99 mg/dL    Urea Nitrogen 10 7 - 30 mg/dL    Creatinine 0.69 0.52 - 1.04 mg/dL    GFR Estimate >90 >60 mL/min/[1.73_m2]    GFR Estimate If Black >90 >60 mL/min/[1.73_m2]    Calcium 9.7 8.5 - 10.1 mg/dL     CHEST TWO VIEWS  4/16/2019 2:23 PM      HISTORY: Influenza-like illness.     COMPARISON: None.      FINDINGS:  There are no acute infiltrates. The cardiac silhouette is  not enlarged. Pulmonary vasculature is unremarkable.                                                                       IMPRESSION: No acute disease.     STACIA DUCKWORTH MD    ASSESSMENT/PLAN:         Tobacco Cessation:   reports that she quit smoking about 31 years ago. Her smoking use included cigarettes. She started smoking about 34 years ago. She has a 1.50 pack-year smoking history. She has never used smokeless tobacco.      BMI:   Estimated body mass index is 29.29 kg/m  as calculated from the following:    Height as of this encounter: 1.651 m (5' 5\").    Weight as of this encounter: 79.8 kg (176 lb).   Weight management plan: Discussed healthy diet and exercise guidelines        ICD-10-CM    1. Acute bronchitis, unspecified organism- chest xray normal but will treat with antibiotics due to coughing spasms and wheezing J20.9 azithromycin (ZITHROMAX) 250 MG tablet   2. Influenza-like illness- start of infection sounds like it may have been influenza, not testing this late in the course of infection. R69 CBC with platelets     Basic metabolic panel     XR Chest 2 Views   3. Screen for colon cancer Z12.11 GASTROENTEROLOGY ADULT REF PROCEDURE ONLY [facility] [phone]; [facility]   4. Visit for screening mammogram Z12.31 MA SCREENING DIGITAL BILAT - Future  (s+30)   5. Wheezing R06.2 albuterol (PROAIR HFA/PROVENTIL HFA/VENTOLIN HFA) 108 (90 Base) MCG/ACT inhaler- will check for insurance coverage on either " inhaler.      Ipratropium-Albuterol (COMBIVENT RESPIMAT)  MCG/ACT inhaler       FUTURE LABS:       - Schedule a fasting blood draw 1-2 months  FUTURE APPOINTMENTS:       - Follow-up visit in 1-2 months or sooner if any questions or concerns. Due for health maintenance and annual exam. Discussed mammogram and colonoscopy.   Work on weight loss  Regular exercise  See Patient Instructions    Ebony Cruz MD  [facility]

## 2019-04-16 NOTE — PATIENT INSTRUCTIONS
At [facility], we strive to deliver an exceptional experience to you, every time we see you.  If you receive a survey in the mail, please send us back your thoughts. We really do value your feedback.    Based on your medical history, these are the current health maintenance/preventive care services that you are due for (some may have been done at this visit.)  Health Maintenance Due   Topic Date Due     HIV SCREEN (SYSTEM ASSIGNED)  08/08/1974     ZOSTER IMMUNIZATION (1 of 2) 08/08/2006     ADVANCE DIRECTIVE PLANNING Q5 YRS  11/23/2017     MAMMO Q1 YR  12/13/2017     PREVENTIVE CARE VISIT  12/14/2017     PHQ-2 Q1 YR  12/14/2017     INFLUENZA VACCINE (1) 09/01/2018     COLONOSCOPY Q5 YR  12/05/2018         Suggested websites for health information:  Www.[employer]le.Cloudacc : Up to date and easily searchable information on multiple topics.  Www.writewith.gov : medication info, interactive tutorials, watch real surgeries online  Www.familydoctor.org : good info from the Academy of Family Physicians  Www.cdc.gov : public health info, travel advisories, epidemics (H1N1)  Www.aap.org : children's health info, normal development, vaccinations  Www.health.state.[place].us : [place] dept of health, public health issues in [place], N1N1    Your care team:                            Family Medicine Internal Medicine   MD Michael Noyola MD Shantel Branch-Fleming, MD Katya Georgiev PA-C Nam Ho, MD Pediatrics   HALLIE Worthy, HILDA Sharif APRN MD Rosa Nicholson MD Deborah Mielke, MD Kim Thein, APRN [facility]      Clinic hours: Monday - Thursday 7 am-7 pm; Fridays 7 am-5 pm.   Urgent care: Monday - Friday 11 am-9 pm; Saturday and Sunday 9 am-5 pm.  Pharmacy : Monday -Thursday 8 am-8 pm; Friday 8 am-6 pm; Saturday and Sunday 9 am-5 pm.     Clinic: [phone]   Pharmacy: [phone]      Patient Education     Bronchitis, Antibiotic Treatment  (Adult)    Bronchitis is an infection of the air passages (bronchial tubes) in your lungs. It often occurs when you have a cold. This illness is contagious during the first few days and is spread through the air by coughing and sneezing, or by direct contact (touching the sick person and then touching your own eyes, nose, or mouth).  Symptoms of bronchitis include cough with mucus (phlegm) and low-grade fever. Bronchitis usually lasts 7 to 14 days. Mild cases can be treated with simple home remedies. More severe infection is treated with an antibiotic.  Home care  Follow these guidelines when caring for yourself at home:    If your symptoms are severe, rest at home for the first 2 to 3 days. When you go back to your usual activities, don't let yourself get too tired.    Don't smoke. Also stay away from secondhand smoke.    You may use over-the-counter medicines to control fever or pain, unless another medicine was prescribed. If you have chronic liver or kidney disease or have ever had a stomach ulcer or gastrointestinal bleeding, talk with your healthcare provider before using these medicines. Also talk to your provider if you are taking medicine to prevent blood clots. Aspirin should never be given to anyone younger than 18 who is ill with a viral infection or fever. It may cause severe liver or brain damage.    Your appetite may be low, so a light diet is fine. Stay well hydrated by drinking 6 to 8 glasses of fluids per day. This includes water, soft drinks, sports drinks, juices, tea, or soup. Extra fluids will help loosen mucus in your nose and lungs.    Over-the-counter cough, cold, and sore-throat medicines will not shorten the length of the illness, but they may be helpful to reduce your symptoms. Don't use decongestants if you have high blood pressure.    Finish all antibiotic medicine. Do this even if you are feeling better after only a few days.  Follow-up care  Follow up with your healthcare provider, or  as advised. If you had an X-ray or ECG (electrocardiogram), a specialist will review it. You will be told of any new test results that may affect your care.  If you are age 65 or older, if you smoke, or if you have a chronic lung disease or condition that affects your immune system, ask your healthcare provider about getting a pneumococcal vaccine and a yearly flu shot (influenza vaccine).  When to seek medical advice  Call your healthcare provider right away if any of these occur:    Fever of 100.4 F (38 C) or higher, or as directed by your healthcare provider    Coughing up more sputum    Weakness, drowsiness, headache, facial pain, ear pain, or a stiff neck     Call 911  Call 911 if any of these occur.    Coughing up blood    Weakness, drowsiness, headache, or stiff neck that get worse    Trouble breathing, wheezing, or pain with breathing   Date Last Reviewed: 6/1/2018 2000-2018 [employer]. [address] [place] [address], [place] [address]. All rights reserved. This information is not intended as a substitute for professional medical care. Always follow your healthcare professional's instructions.

## 2019-04-17 NOTE — RESULT ENCOUNTER NOTE
Dear Carito    Your test results are attached. I am happy to let you know that they are stable.    The radiologist did not see pneumonia either.    Please contact me by MyChart if you have any questions about your labs or management.    Ebony Cruz MD

## 2019-04-17 NOTE — RESULT ENCOUNTER NOTE
Dear Carito    Your test results are attached. I am happy to let you know that they are stable.    The complete blood count looks good. The blood sugar is normal and you do not have diabetes. The kidneys are healthy.     Please contact me by [employer] if you have any questions about your labs or management.    Ebony Cruz MD

## 2019-08-14 ENCOUNTER — OFFICE VISIT (OUTPATIENT)
Dept: FAMILY MEDICINE | Facility: CLINIC | Age: 64
End: 2019-08-14
Payer: COMMERCIAL

## 2019-08-14 VITALS
WEIGHT: 177 LBS | DIASTOLIC BLOOD PRESSURE: 76 MMHG | HEART RATE: 92 BPM | SYSTOLIC BLOOD PRESSURE: 138 MMHG | TEMPERATURE: 97.8 F | OXYGEN SATURATION: 96 % | BODY MASS INDEX: 29.45 KG/M2

## 2019-08-14 DIAGNOSIS — Z12.31 ENCOUNTER FOR SCREENING MAMMOGRAM FOR BREAST CANCER: ICD-10-CM

## 2019-08-14 DIAGNOSIS — R19.5 CHANGE IN STOOL: Primary | ICD-10-CM

## 2019-08-14 DIAGNOSIS — M54.2 NECK PAIN: ICD-10-CM

## 2019-08-14 LAB
ALBUMIN SERPL-MCNC: 3.9 G/DL (ref 3.4–5)
ALBUMIN UR-MCNC: NEGATIVE MG/DL
ALP SERPL-CCNC: 88 U/L (ref 40–150)
ALT SERPL W P-5'-P-CCNC: 30 U/L (ref 0–50)
AMYLASE SERPL-CCNC: 45 U/L (ref 30–110)
ANION GAP SERPL CALCULATED.3IONS-SCNC: 7 MMOL/L (ref 3–14)
APPEARANCE UR: CLEAR
AST SERPL W P-5'-P-CCNC: 14 U/L (ref 0–45)
BACTERIA #/AREA URNS HPF: ABNORMAL /HPF
BILIRUB SERPL-MCNC: 0.2 MG/DL (ref 0.2–1.3)
BILIRUB UR QL STRIP: NEGATIVE
BUN SERPL-MCNC: 10 MG/DL (ref 7–30)
CALCIUM SERPL-MCNC: 9.9 MG/DL (ref 8.5–10.1)
CHLORIDE SERPL-SCNC: 104 MMOL/L (ref 94–109)
CO2 SERPL-SCNC: 28 MMOL/L (ref 20–32)
COLOR UR AUTO: YELLOW
CREAT SERPL-MCNC: 0.72 MG/DL (ref 0.52–1.04)
ERYTHROCYTE [DISTWIDTH] IN BLOOD BY AUTOMATED COUNT: 14.4 % (ref 10–15)
GFR SERPL CREATININE-BSD FRML MDRD: 89 ML/MIN/{1.73_M2}
GLUCOSE SERPL-MCNC: 95 MG/DL (ref 70–99)
GLUCOSE UR STRIP-MCNC: NEGATIVE MG/DL
HCT VFR BLD AUTO: 44.7 % (ref 35–47)
HGB BLD-MCNC: 14.2 G/DL (ref 11.7–15.7)
HGB UR QL STRIP: ABNORMAL
KETONES UR STRIP-MCNC: NEGATIVE MG/DL
LEUKOCYTE ESTERASE UR QL STRIP: NEGATIVE
LIPASE SERPL-CCNC: 87 U/L (ref 73–393)
MCH RBC QN AUTO: 29.4 PG (ref 26.5–33)
MCHC RBC AUTO-ENTMCNC: 31.8 G/DL (ref 31.5–36.5)
MCV RBC AUTO: 93 FL (ref 78–100)
MUCOUS THREADS #/AREA URNS LPF: PRESENT /LPF
NITRATE UR QL: NEGATIVE
NON-SQ EPI CELLS #/AREA URNS LPF: ABNORMAL /LPF
PH UR STRIP: 5 PH (ref 5–7)
PLATELET # BLD AUTO: 320 10E9/L (ref 150–450)
POTASSIUM SERPL-SCNC: 4.4 MMOL/L (ref 3.4–5.3)
PROT SERPL-MCNC: 8.2 G/DL (ref 6.8–8.8)
RBC # BLD AUTO: 4.83 10E12/L (ref 3.8–5.2)
RBC #/AREA URNS AUTO: ABNORMAL /HPF
SODIUM SERPL-SCNC: 139 MMOL/L (ref 133–144)
SOURCE: ABNORMAL
SP GR UR STRIP: 1.02 (ref 1–1.03)
UROBILINOGEN UR STRIP-ACNC: 0.2 EU/DL (ref 0.2–1)
WBC # BLD AUTO: 11.8 10E9/L (ref 4–11)
WBC #/AREA URNS AUTO: ABNORMAL /HPF

## 2019-08-14 PROCEDURE — 83690 ASSAY OF LIPASE: CPT | Performed by: NURSE PRACTITIONER

## 2019-08-14 PROCEDURE — 80053 COMPREHEN METABOLIC PANEL: CPT | Performed by: NURSE PRACTITIONER

## 2019-08-14 PROCEDURE — 85027 COMPLETE CBC AUTOMATED: CPT | Performed by: NURSE PRACTITIONER

## 2019-08-14 PROCEDURE — 82150 ASSAY OF AMYLASE: CPT | Performed by: NURSE PRACTITIONER

## 2019-08-14 PROCEDURE — 36415 COLL VENOUS BLD VENIPUNCTURE: CPT | Performed by: NURSE PRACTITIONER

## 2019-08-14 PROCEDURE — 81001 URINALYSIS AUTO W/SCOPE: CPT | Performed by: NURSE PRACTITIONER

## 2019-08-14 PROCEDURE — 99214 OFFICE O/P EST MOD 30 MIN: CPT | Performed by: NURSE PRACTITIONER

## 2019-08-14 ASSESSMENT — PAIN SCALES - GENERAL: PAINLEVEL: SEVERE PAIN (7)

## 2019-08-14 NOTE — PROGRESS NOTES
Subjective     Carito Petty is a 64 year old female who presents to clinic today for the following health issues:    HPI   Gastrointestinal symptoms      Duration: x 1.5 weeks    Description    ABDOMINAL PAIN .   Pain is described as cramping and radiates to into low back., starts less than 1 hour after eating.    Intensity:  6-7/10    Accompanying signs and symptoms:  mucus in stools, stools are light colored, formed, soft    History  Previous {similar problem: no   Previous evaluation:  none    Aggravating factors: none    Alleviating factors: nothing  Other Therapies tried: None  No urinary frequency, urgency, dysuria. She is concerned for possible foodborne illness, was travelling to see family and the only thing she ate that was different from other family members was baked eggs and a green salad.  No other family members are ill.  She Is afebrile, denies change in appetite, nausea, vomiting, having 1-3 stools/day.  No recent antibiotic treatment (last antibiotic was Zithromax for sinusitis 4/2019).    Neck Pain      Duration: x 3 days    Description:  Location: middle  Radiation: into the right shoulder and into the left shoulder    Intensity:  7/10    Accompanying signs and symptoms: none    History (similar episodes/previous evaluation): None    Precipitating or alleviating factors: None    Therapies tried and outcome: IBU, ice Tramadol, not really helping, thinks she slept on it wrong, no specific inciting activity.        Patient Active Problem List   Diagnosis     CARDIOVASCULAR SCREENING; LDL GOAL LESS THAN 160     Advanced directives, counseling/discussion     Urgency incontinence     Primary osteoarthritis of both hands     Fibromyalgia     Overweight (BMI 25.0-29.9)     Past Surgical History:   Procedure Laterality Date     COLONOSCOPY  12/5/2013    Procedure: COLONOSCOPY;  colonoscopy screening;  Surgeon: Dennis Minaya MD;  Location: [facility]     HEAD & NECK SURGERY      2003 herniated  disc and bone spurs       Social History     Tobacco Use     Smoking status: Former Smoker     Packs/day: 0.50     Years: 3.00     Pack years: 1.50     Types: Cigarettes     Start date: 1984     Last attempt to quit: 1987     Years since quittin.0     Smokeless tobacco: Never Used   Substance Use Topics     Alcohol use: Yes     Family History   Problem Relation Age of Onset     Alcohol/Drug Mother      Substance Abuse Mother      Allergies Father      Alcohol/Drug Father      Cancer Maternal Grandmother         cervical     Other Cancer Maternal Grandmother      Cancer Paternal Grandmother 68        breast     Breast Cancer Paternal Grandmother      Other Cancer Paternal Grandmother      Cancer Other      Other Cancer Other      Arthritis Sister      Anxiety Disorder Sister      Mental Illness Brother      Substance Abuse Brother      Substance Abuse Sister      Skin Cancer No family hx of          Current Outpatient Medications   Medication Sig Dispense Refill     albuterol (PROAIR HFA/PROVENTIL HFA/VENTOLIN HFA) 108 (90 Base) MCG/ACT inhaler Inhale 2 puffs into the lungs every 6 hours as needed for shortness of breath / dyspnea or wheezing 8.5 g 0     gabapentin (NEURONTIN) 300 MG capsule TK 2 CS PO QD  1     ibuprofen (ADVIL,MOTRIN) 600 MG tablet Take 1 tablet (600 mg) by mouth every 6 hours as needed for moderate pain 30 tablet 3     Ipratropium-Albuterol (COMBIVENT RESPIMAT)  MCG/ACT inhaler Inhale 1 puff into the lungs 4 times daily as needed for shortness of breath / dyspnea or wheezing 4 g 1     Methocarbamol (ROBAXIN-750 PO) Take  by mouth. As needed       MULTI-VITAMIN OR TABS 1 TABLET DAILY       Omega-3 Fatty Acids (FISH OIL PO) Take  by mouth daily.       tramadol (ULTRAM) 50 MG tablet Take 50 mg by mouth every 6 hours as needed.       venlafaxine (EFFEXOR) 37.5 MG tablet TK 1 T PO BID  11     BP Readings from Last 3 Encounters:   19 138/76   19 132/76   01/15/17 147/76     Wt Readings from Last 3 Encounters:   08/14/19 80.3 kg (177 lb)   04/16/19 79.8 kg (176 lb)   01/15/17 81.6 kg (180 lb)              Reviewed and updated as needed this visit by Provider         Review of Systems   ROS COMP: Constitutional, HEENT, cardiovascular, pulmonary, gi and gu systems are negative, except as otherwise noted.      Objective    /76   Pulse 92   Temp 97.8  F (36.6  C) (Oral)   Wt 80.3 kg (177 lb)   SpO2 96%   Breastfeeding? No   BMI 29.45 kg/m    Body mass index is 29.45 kg/m .  Physical Exam   GENERAL: healthy, alert and no distress  EYES: Eyes grossly normal to inspection, PERRL and conjunctivae and sclerae normal  HENT: ear canals and TM's normal, nose and mouth without ulcers or lesions  NECK: no adenopathy, no asymmetry, masses, or scars and thyroid normal to palpation  RESP: lungs clear to auscultation - no rales, rhonchi or wheezes  CV: regular rate and rhythm, normal S1 S2, no S3 or S4, no murmur, click or rub, no peripheral edema and peripheral pulses strong  ABDOMEN: soft, nontender, no hepatosplenomegaly, no masses and bowel sounds normal  MS: no gross musculoskeletal defects noted, no edema  SKIN: no suspicious lesions or rashes  NEURO: Normal strength and tone, mentation intact and speech normal  BACK: no CVA tenderness, no paralumbar tenderness  PSYCH: mentation appears normal, affect normal/bright  LYMPH: normal ant/post cervical, supraclavicular nodes    Diagnostic Test Results:  Labs reviewed in Epic  Results for orders placed or performed in visit on 08/14/19   Comprehensive metabolic panel   Result Value Ref Range    Sodium 139 133 - 144 mmol/L    Potassium 4.4 3.4 - 5.3 mmol/L    Chloride 104 94 - 109 mmol/L    Carbon Dioxide 28 20 - 32 mmol/L    Anion Gap 7 3 - 14 mmol/L    Glucose 95 70 - 99 mg/dL    Urea Nitrogen 10 7 - 30 mg/dL    Creatinine 0.72 0.52 - 1.04 mg/dL    GFR Estimate 89 >60 mL/min/[1.73_m2]    GFR Estimate If Black >90 >60 mL/min/[1.73_m2]     Calcium 9.9 8.5 - 10.1 mg/dL    Bilirubin Total 0.2 0.2 - 1.3 mg/dL    Albumin 3.9 3.4 - 5.0 g/dL    Protein Total 8.2 6.8 - 8.8 g/dL    Alkaline Phosphatase 88 40 - 150 U/L    ALT 30 0 - 50 U/L    AST 14 0 - 45 U/L   Amylase   Result Value Ref Range    Amylase 45 30 - 110 U/L   Lipase   Result Value Ref Range    Lipase 87 73 - 393 U/L   CBC with platelets   Result Value Ref Range    WBC 11.8 (H) 4.0 - 11.0 10e9/L    RBC Count 4.83 3.8 - 5.2 10e12/L    Hemoglobin 14.2 11.7 - 15.7 g/dL    Hematocrit 44.7 35.0 - 47.0 %    MCV 93 78 - 100 fl    MCH 29.4 26.5 - 33.0 pg    MCHC 31.8 31.5 - 36.5 g/dL    RDW 14.4 10.0 - 15.0 %    Platelet Count 320 150 - 450 10e9/L   UA reflex to Microscopic and Culture   Result Value Ref Range    Color Urine Yellow     Appearance Urine Clear     Glucose Urine Negative NEG^Negative mg/dL    Bilirubin Urine Negative NEG^Negative    Ketones Urine Negative NEG^Negative mg/dL    Specific Gravity Urine 1.025 1.003 - 1.035    Blood Urine Trace (A) NEG^Negative    pH Urine 5.0 5.0 - 7.0 pH    Protein Albumin Urine Negative NEG^Negative mg/dL    Urobilinogen Urine 0.2 0.2 - 1.0 EU/dL    Nitrite Urine Negative NEG^Negative    Leukocyte Esterase Urine Negative NEG^Negative    Source Midstream Urine    Urine Microscopic   Result Value Ref Range    WBC Urine 0 - 5 OTO5^0 - 5 /HPF    RBC Urine O - 2 OTO2^O - 2 /HPF    Squamous Epithelial /LPF Urine Few FEW^Few /LPF    Bacteria Urine Few (A) NEG^Negative /HPF    Mucous Urine Present (A) NEG^Negative /LPF           Assessment & Plan     1. Change in stool  Checking labs, consider getting RUQ US.  [place] diet encouraged- avoid citrus, spicy, fatty foods, stay well hydratd, reviewed indications for rtc/UC visit.    - CBC with platelets  - UA reflex to Microscopic and Culture  - Urine Microscopic  - US Abdomen Limited; Future    2. Neck pain  Muscle tightness along supraspinatus bilaterally, ok to use heat, tylenol for pain, return to clinic if not improved,  "new, or worsening symptoms and would refer to Patient.    3. Encounter for screening mammogram for breast cancer    - MA SCREENING DIGITAL BILAT - Future  (s+30); Future  BMI:   Estimated body mass index is 29.45 kg/m  as calculated from the following:    Height as of 4/16/19: 1.651 m (5' 5\").    Weight as of this encounter: 80.3 kg (177 lb).   Weight management plan: Discussed healthy diet and exercise guidelines        Work on weight loss  Regular exercise  See Patient Instructions    Return in about 1 week (around 8/21/2019) for Routine Visit.    DALIA Wick [facility]      "

## 2019-08-14 NOTE — PATIENT INSTRUCTIONS
At [facility], we strive to deliver an exceptional experience to you, every time we see you.  If you receive a survey in the mail, please send us back your thoughts. We really do value your feedback.    Your care team:                            Family Medicine Internal Medicine   MD Michael Noyola MD Shantel Branch-Fleming, MD Katya Georgiev PA-C Megan Hill, APRN CNP    Erwin Judd, MD Pediatrics   Ricardo Mcnamara, HALLIE Templeton, MD Tahira Sam APRN CNP   MD Rosa Burns MD Deborah Mielke, MD Trish Hernández, APRN [facility]      Clinic hours: Monday - Thursday 7 am-7 pm; Fridays 7 am-5 pm.   Urgent care: Monday - Friday 11 am-9 pm; Saturday and Sunday 9 am-5 pm.  Pharmacy : Monday -Thursday 8 am-8 pm; Friday 8 am-6 pm; Saturday and Sunday 9 am-5 pm.     Clinic: [phone]   Pharmacy: [phone]        Patient Education     Unknown Causes of Abdominal Pain (Female)    The exact cause of your belly (abdominal) pain is not clear. This does not mean that this is something to worry about. Everyone likes to know the exact cause of the problem. But sometimes with belly pain, there is no clear-cut cause, and this could be a good thing. The good news is that your symptoms can be treated, and you will feel better.   Your condition does not seem serious now. But sometimes the signs of a serious problem may take more time to appear. For this reason, it is important for you to watch for any new symptoms, problems, or worsening of your condition.  Over the next few days, the abdominal pain may come and go. Or it may be constant. Other common symptoms can include nausea and vomiting. Sometimes it can be difficult to tell if you feel nauseous. You may just feel bad and not connect that feeling to nausea. Constipation, diarrhea, and a fever may go along with the pain.  The pain may continue even if treated correctly over the following days.  Depending on how things go, sometimes the cause can become clear and may need more or different treatment. Additional evaluations, medicines, or tests may also be needed.  Home care  Your healthcare provider may prescribe medicine for pain, symptoms, or an infection.  Follow the healthcare provider's instructions for taking these medicines.  General care    Rest as much as you can until your next exam. No strenuous activities.    Try to find positions that ease discomfort. A small pillow placed on the abdomen may help relieve pain.    Something warm on your abdomen (such as a heating pad) may help, but be careful not to burn yourself.  Diet    Don t force yourself to eat, especially if having cramps, vomiting, or diarrhea.    Water is important so you don't get dehydrated. Soup may also be good. Sports drinks may also help, especially if they are not too acidic. Don't drink sugary drinks as this can make things worse. Take liquids in small amounts. Don t guzzle them.    Caffeine sometimes makes the pain and cramping worse.    Don t take dairy products if you have vomiting or diarrhea.    Don't eat large amounts at a time. Wait a few minutes between bites.    Eat a diet low in fiber (called a low-residue diet). Foods allowed include refined breads, white rice, fruit and vegetable juices without pulp, tender meats. These foods will pass more easily through the intestine.    Don t have whole-grain foods, whole fruits and vegetables, meats, seeds and nuts, fried or fatty foods, dairy, alcohol and spicy foods until your symptoms go away.  Follow-up care  Follow up with your healthcare provider, or as advised, if your pain does not begin to improve in the next 24 hours.  Call 911  Call 911 if any of these occur:    Trouble breathing    Confusion    Fainting or loss of consciousness    Rapid heart rate    Seizure  When to seek medical advice  Call your healthcare provider right away if any of these occur:    Pain gets  worse or moves to the right lower abdomen    New or worsening vomiting or diarrhea    Swelling of the abdomen    Unable to pass stool for more than 3 days    Fever of 100.4 F (38 C) or higher, or as directed by your healthcare provider.    Blood in vomit or bowel movements (dark red or black color)    Yellow color of eyes and skin (jaundice)    Weakness, dizziness    Chest, arm, back, neck, or jaw pain    Unexpected vaginal bleeding or missed period    Can't keep down liquids or water and you are getting dehydrated  Date Last Reviewed: 6/1/2018 2000-2018 [employer]. [address] [place] [address]. All rights reserved. This information is not intended as a substitute for professional medical care. Always follow your healthcare professional's instructions.

## 2019-08-15 ENCOUNTER — MYC MEDICAL ADVICE (OUTPATIENT)
Dept: FAMILY MEDICINE | Facility: CLINIC | Age: 64
End: 2019-08-15

## 2019-08-16 ENCOUNTER — MYC MEDICAL ADVICE (OUTPATIENT)
Dept: FAMILY MEDICINE | Facility: CLINIC | Age: 64
End: 2019-08-16

## 2019-08-17 ENCOUNTER — MYC MEDICAL ADVICE (OUTPATIENT)
Dept: FAMILY MEDICINE | Facility: CLINIC | Age: 64
End: 2019-08-17

## 2019-08-22 ENCOUNTER — MYC MEDICAL ADVICE (OUTPATIENT)
Dept: FAMILY MEDICINE | Facility: CLINIC | Age: 64
End: 2019-08-22

## 2019-08-29 ENCOUNTER — MYC MEDICAL ADVICE (OUTPATIENT)
Dept: FAMILY MEDICINE | Facility: CLINIC | Age: 64
End: 2019-08-29

## 2019-12-02 ENCOUNTER — VIRTUAL VISIT (OUTPATIENT)
Dept: FAMILY MEDICINE | Facility: OTHER | Age: 64
End: 2019-12-02

## 2019-12-03 NOTE — PROGRESS NOTES
"Date: 2019 18:09:34  Clinician: Wong Kerr  Clinician NPI: [id number]  Patient: Carito Petty  Patient : 1955  Patient Address: [address] [place] [address]  Patient Phone: [phone]  Visit Protocol: UTI  Patient Summary:  Carito is a 64 year old ( : 1955 ) female who initiated a Visit for a presumed bladder infection. When asked the question \"Please sign me up to receive news, health information and promotions. \", Carito responded \"No\".   Her symptoms started today and consist of dysuria, urgency, urinary incontinence, foul-smelling urine, feeling as if the bladder is never empty, and urinary frequency.   Symptom details   Urine color: The color of her urine is yellow.    Denied symptoms include vaginal discharge, vomiting, vaginal itching, nausea, flank pain, abdominal pain, and chills. She does not feel feverish.   Carito has not used any over-the-counter medications or home remedies to relieve her current symptoms.  Precipitating events  Carito denies having a sexually transmitted disease.  Pertinent medical history  Carito has had a bladder infection before but has not had any in the past 12 months. Her current symptoms are similar to her previous bladder infection symptoms.   She is not sure what antibiotics have been effective in treating her past bladder infections.   Carito has not been prescribed antibiotics to prevent frequent or repeated bladder infections in the past and does not get yeast infections when she takes antibiotics. She has not experienced problems or side effects with any of the common antibiotics used to treat bladder infections.   Carito does not have a history of kidney stones. She has not used a catheter or been a patient in a hospital or nursing home in the past 2 weeks.   Carito does not smoke or use smokeless tobacco.     MEDICATIONS: venlafaxine oral, gabapentin oral, tramadol oral, ALLERGIES: Penicillins  Clinician Response:  Dear Carito,  " Based on the information you have provided, you likely have an acute urinary tract infection, also called a bladder infection. Bladder infections occur when bacteria from the outside of the body enters the urinary tract. Any part of the urinary system can be infected, but the bladder is the most common.  Medication information  I am prescribing:     Nitrofurantoin monohyd/m-cryst (Macrobid) 100 mg oral capsule. Take 1 capsule by mouth every 12 hours for 5 days. Take this medication with food. There are no refills with this prescription.   The medication I prescribed for your bladder infection is an antibiotic. Continue taking the medication until it is gone even if you feel better.   Yeast infections can be a common side effect of antibiotics. The most common symptom of a yeast infection is itchiness in and around the vagina. Other signs and symptoms include burning, redness, or a thick, white vaginal discharge that looks like cottage cheese and does not have a bad smell.  Self care  Urination helps to flush bacteria from the urinary tract. For this reason, drinking water and urinating often helps relieve some urinary symptoms and can decrease your risk of getting bladder infections in the future.  Other steps you can take to prevent future bladder infections include:     Wipe front to back after using the bathroom    Urinate after sexual intercourse    Avoid using deodorant sprays, douches, or powders in the vaginal area     When to seek care  Please make an appointment to be seen in a clinic or urgent care if any of the following occur:     You develop new symptoms or your symptoms become worse    You have medication side effects that make it difficult to take them as prescribed    Your symptoms do not improve within 1-2 days of starting treatment    You have symptoms of a bladder infection that return shortly after completing treatment     It is possible to have an allergic reaction to an antibiotic even if you  have not had one in the past. If you notice a new rash, significant swelling, or difficulty breathing, stop taking this medication immediately and go to a clinic or urgent care.   Diagnosis: Acute uncomplicated bladder infection  Diagnosis ICD: N39.0  Prescription: nitrofurantoin monohyd/m-cryst (Macrobid) 100 mg oral capsule 10 capsule, 5 days supply. Take 1 capsule by mouth every 12 hours for 5 days. Refills: 0, Refill as needed: no, Allow substitutions: yes  Pharmacy: [id number] - [phone] [address] [place] [address]

## 2019-12-13 ENCOUNTER — OFFICE VISIT (OUTPATIENT)
Dept: URGENT CARE | Facility: URGENT CARE | Age: 64
End: 2019-12-13
Payer: COMMERCIAL

## 2019-12-13 VITALS
HEART RATE: 74 BPM | SYSTOLIC BLOOD PRESSURE: 128 MMHG | RESPIRATION RATE: 16 BRPM | OXYGEN SATURATION: 98 % | TEMPERATURE: 98.7 F | WEIGHT: 168 LBS | BODY MASS INDEX: 27.96 KG/M2 | DIASTOLIC BLOOD PRESSURE: 80 MMHG

## 2019-12-13 DIAGNOSIS — N39.0 URINARY TRACT INFECTION WITHOUT HEMATURIA, SITE UNSPECIFIED: Primary | ICD-10-CM

## 2019-12-13 DIAGNOSIS — R30.0 DYSURIA: ICD-10-CM

## 2019-12-13 DIAGNOSIS — R82.90 NONSPECIFIC FINDING ON EXAMINATION OF URINE: ICD-10-CM

## 2019-12-13 LAB
ALBUMIN UR-MCNC: ABNORMAL MG/DL
APPEARANCE UR: ABNORMAL
BACTERIA #/AREA URNS HPF: ABNORMAL /HPF
BILIRUB UR QL STRIP: NEGATIVE
COLOR UR AUTO: YELLOW
GLUCOSE UR STRIP-MCNC: NEGATIVE MG/DL
HGB UR QL STRIP: ABNORMAL
KETONES UR STRIP-MCNC: NEGATIVE MG/DL
LEUKOCYTE ESTERASE UR QL STRIP: ABNORMAL
MUCOUS THREADS #/AREA URNS LPF: PRESENT /LPF
NITRATE UR QL: NEGATIVE
NON-SQ EPI CELLS #/AREA URNS LPF: ABNORMAL /LPF
PH UR STRIP: 6 PH (ref 5–7)
RBC #/AREA URNS AUTO: ABNORMAL /HPF
SOURCE: ABNORMAL
SP GR UR STRIP: 1.02 (ref 1–1.03)
UROBILINOGEN UR STRIP-ACNC: 0.2 EU/DL (ref 0.2–1)
WBC #/AREA URNS AUTO: >100 /HPF
WBC CLUMPS #/AREA URNS HPF: PRESENT /HPF

## 2019-12-13 PROCEDURE — 87088 URINE BACTERIA CULTURE: CPT | Performed by: PHYSICIAN ASSISTANT

## 2019-12-13 PROCEDURE — 87086 URINE CULTURE/COLONY COUNT: CPT | Performed by: PHYSICIAN ASSISTANT

## 2019-12-13 PROCEDURE — 81001 URINALYSIS AUTO W/SCOPE: CPT | Performed by: PHYSICIAN ASSISTANT

## 2019-12-13 PROCEDURE — 87186 SC STD MICRODIL/AGAR DIL: CPT | Performed by: PHYSICIAN ASSISTANT

## 2019-12-13 PROCEDURE — 99213 OFFICE O/P EST LOW 20 MIN: CPT | Performed by: PHYSICIAN ASSISTANT

## 2019-12-13 RX ORDER — CIPROFLOXACIN 500 MG/1
500 TABLET, FILM COATED ORAL 2 TIMES DAILY
Qty: 14 TABLET | Refills: 0 | Status: SHIPPED | OUTPATIENT
Start: 2019-12-13 | End: 2019-12-20

## 2019-12-13 RX ORDER — NITROFURANTOIN 25; 75 MG/1; MG/1
CAPSULE ORAL
Refills: 0 | COMMUNITY
Start: 2019-12-02 | End: 2023-10-31

## 2019-12-13 NOTE — PROGRESS NOTES
S: 64-year-old female presents for evaluation of dysuria, frequency and urgency for 2 days.  She did a virtual visit December 2 time was placed on Macrobid for 5 days.  While she was on it she felt her symptoms resolved.  However after finishing it 3 days later her symptoms returned.  She also notes today some mild discomfort bilateral lower back area.  No fever.  No nausea or vomiting.  Mild headache today.      Allergies   Allergen Reactions     Penicillins      SOB, hives       Past Medical History:   Diagnosis Date     Arthritis      Depressive disorder      Fibrocystic breast      Fibromyalgia      Neuropathy     extremities     PMDD (premenstrual dysphoric disorder)     depresion     Seborrheic dermatitis 2008    face, scalp       gabapentin (NEURONTIN) 300 MG capsule, TK 2 CS PO QD  ibuprofen (ADVIL,MOTRIN) 600 MG tablet, Take 1 tablet (600 mg) by mouth every 6 hours as needed for moderate pain  Methocarbamol (ROBAXIN-750 PO), Take  by mouth. As needed  MULTI-VITAMIN OR TABS, 1 TABLET DAILY  Omega-3 Fatty Acids (FISH OIL PO), Take  by mouth daily.  tramadol (ULTRAM) 50 MG tablet, Take 50 mg by mouth every 6 hours as needed.  venlafaxine (EFFEXOR) 37.5 MG tablet, TK 1 T PO BID  albuterol (PROAIR HFA/PROVENTIL HFA/VENTOLIN HFA) 108 (90 Base) MCG/ACT inhaler, Inhale 2 puffs into the lungs every 6 hours as needed for shortness of breath / dyspnea or wheezing (Patient not taking: Reported on 12/13/2019)  Ipratropium-Albuterol (COMBIVENT RESPIMAT)  MCG/ACT inhaler, Inhale 1 puff into the lungs 4 times daily as needed for shortness of breath / dyspnea or wheezing (Patient not taking: Reported on 12/13/2019)  nitroFURantoin macrocrystal-monohydrate (MACROBID) 100 MG capsule, TK 1 C PO Q 12 H FOR 5 DAYS    No current facility-administered medications on file prior to visit.       Social History     Tobacco Use     Smoking status: Former Smoker     Packs/day: 0.50     Years: 3.00     Pack years: 1.50     Types:  Cigarettes     Start date: 1984     Last attempt to quit: 1987     Years since quittin.3     Smokeless tobacco: Never Used   Substance Use Topics     Alcohol use: Yes       ROS:  CONSTITUTIONAL: Negative for fatigue or fever.  EYES: Negative for eye problems.  ENT: neg for ST.  RESP:neg for SOB  CV: Negative for chest pains.  GI: Negative for vomiting.  MUSCULOSKELETAL:  Negative for significant muscle or joint pains.  NEUROLOGIC: Negative for headaches.  SKIN: Negative for rash.  PSYCH: Normal mentation for age.  - as above    OBJECTIVE:  /80   Pulse 74   Temp 98.7  F (37.1  C) (Oral)   Resp 16   Wt 76.2 kg (168 lb)   SpO2 98%   BMI 27.96 kg/m    GENERAL APPEARANCE: Healthy, alert and no distress.  EYES:Conjunctiva/sclera clear.  EARS: No cerumen.   Ear canals w/o erythema.  TM's intact w/o erythema.    NOSE/MOUTH: Nose without ulcers, erythema or lesions.  SINUSES: No maxillary sinus tenderness.  THROAT: No erythema w/o tonsillar enlargement . No exudates.  NECK: Supple, nontender, no lymphadenopathy.  RESP: Lungs clear to auscultation - no rales, rhonchi or wheezes  CV: Regular rate and rhythm, normal S1 S2, no murmur noted.  NEURO: Awake, alert    SKIN: No rashes  Back-possible mild bilateral CVA tenderness, may be a bit too low to be kidney discomfort.  Abdomen-soft, mild suprapubic tenderness.  Normal active bowel sounds.  No rigidity, rebound or guarding    Results for orders placed or performed in visit on 19   UA reflex to Microscopic and Culture     Status: Abnormal   Result Value Ref Range    Color Urine Yellow     Appearance Urine Cloudy     Glucose Urine Negative NEG^Negative mg/dL    Bilirubin Urine Negative NEG^Negative    Ketones Urine Negative NEG^Negative mg/dL    Specific Gravity Urine 1.025 1.003 - 1.035    Blood Urine Moderate (A) NEG^Negative    pH Urine 6.0 5.0 - 7.0 pH    Protein Albumin Urine Trace (A) NEG^Negative mg/dL    Urobilinogen Urine 0.2 0.2 - 1.0  EU/dL    Nitrite Urine Negative NEG^Negative    Leukocyte Esterase Urine Large (A) NEG^Negative    Source Midstream Urine    Urine Microscopic     Status: Abnormal   Result Value Ref Range    WBC Urine >100 (A) OTO5^0 - 5 /HPF    RBC Urine 10-25 (A) OTO2^O - 2 /HPF    WBC Clumps Present (A) NEG^Negative /HPF    Squamous Epithelial /LPF Urine Few FEW^Few /LPF    Bacteria Urine Moderate (A) NEG^Negative /HPF    Mucous Urine Present (A) NEG^Negative /LPF         ASSESSMENT:   Results for orders placed or performed in visit on 12/13/19   UA reflex to Microscopic and Culture     Status: Abnormal   Result Value Ref Range    Color Urine Yellow     Appearance Urine Cloudy     Glucose Urine Negative NEG^Negative mg/dL    Bilirubin Urine Negative NEG^Negative    Ketones Urine Negative NEG^Negative mg/dL    Specific Gravity Urine 1.025 1.003 - 1.035    Blood Urine Moderate (A) NEG^Negative    pH Urine 6.0 5.0 - 7.0 pH    Protein Albumin Urine Trace (A) NEG^Negative mg/dL    Urobilinogen Urine 0.2 0.2 - 1.0 EU/dL    Nitrite Urine Negative NEG^Negative    Leukocyte Esterase Urine Large (A) NEG^Negative    Source Midstream Urine    Urine Microscopic     Status: Abnormal   Result Value Ref Range    WBC Urine >100 (A) OTO5^0 - 5 /HPF    RBC Urine 10-25 (A) OTO2^O - 2 /HPF    WBC Clumps Present (A) NEG^Negative /HPF    Squamous Epithelial /LPF Urine Few FEW^Few /LPF    Bacteria Urine Moderate (A) NEG^Negative /HPF    Mucous Urine Present (A) NEG^Negative /LPF         PLAN: Reviewed her chart.  Normal kidney function in August 2019.  She had been on Cipro 500 mg January 2017.  She did not have any issues with this.  Denies any tendinitis from it.  Will treat with Cipro 500 twice a day for 7 days.  Urine culture.  Recheck 3 days if not better.  I have discussed clinical findings with patient.  Side effects of medications discussed.  Symptomatic care is discussed.  I have discussed the possibility of  worsening symptoms and to RTC or  ER if they occur.  All questions are answered and patient is in agreement with plan.   Patient care instructions are given to at the end of visit.   Lots of rest and fluids.    Manisha Castle PA-C

## 2019-12-16 LAB
BACTERIA SPEC CULT: ABNORMAL
SPECIMEN SOURCE: ABNORMAL

## 2020-02-24 ENCOUNTER — HEALTH MAINTENANCE LETTER (OUTPATIENT)
Age: 65
End: 2020-02-24

## 2020-12-13 ENCOUNTER — HEALTH MAINTENANCE LETTER (OUTPATIENT)
Age: 65
End: 2020-12-13

## 2021-04-17 ENCOUNTER — HEALTH MAINTENANCE LETTER (OUTPATIENT)
Age: 66
End: 2021-04-17

## 2021-04-26 ENCOUNTER — IMMUNIZATION (OUTPATIENT)
Dept: PEDIATRICS | Facility: CLINIC | Age: 66
End: 2021-04-26
Payer: COMMERCIAL

## 2021-04-26 PROCEDURE — 91300 PR COVID VAC PFIZER DIL RECON 30 MCG/0.3 ML IM: CPT

## 2021-04-26 PROCEDURE — 0001A PR COVID VAC PFIZER DIL RECON 30 MCG/0.3 ML IM: CPT

## 2021-05-17 ENCOUNTER — IMMUNIZATION (OUTPATIENT)
Dept: PEDIATRICS | Facility: CLINIC | Age: 66
End: 2021-05-17
Attending: INTERNAL MEDICINE
Payer: COMMERCIAL

## 2021-05-17 PROCEDURE — 0002A PR COVID VAC PFIZER DIL RECON 30 MCG/0.3 ML IM: CPT

## 2021-05-17 PROCEDURE — 91300 PR COVID VAC PFIZER DIL RECON 30 MCG/0.3 ML IM: CPT

## 2021-09-26 ENCOUNTER — HEALTH MAINTENANCE LETTER (OUTPATIENT)
Age: 66
End: 2021-09-26

## 2021-12-23 ENCOUNTER — IMMUNIZATION (OUTPATIENT)
Dept: NURSING | Facility: CLINIC | Age: 66
End: 2021-12-23
Payer: COMMERCIAL

## 2021-12-23 PROCEDURE — 91300 PR COVID VAC PFIZER DIL RECON 30 MCG/0.3 ML IM: CPT

## 2021-12-23 PROCEDURE — 0004A PR COVID VAC PFIZER DIL RECON 30 MCG/0.3 ML IM: CPT

## 2022-05-08 ENCOUNTER — HEALTH MAINTENANCE LETTER (OUTPATIENT)
Age: 67
End: 2022-05-08

## 2023-04-23 ENCOUNTER — HEALTH MAINTENANCE LETTER (OUTPATIENT)
Age: 68
End: 2023-04-23

## 2023-06-02 ENCOUNTER — HEALTH MAINTENANCE LETTER (OUTPATIENT)
Age: 68
End: 2023-06-02

## 2023-10-31 ENCOUNTER — VIRTUAL VISIT (OUTPATIENT)
Dept: FAMILY MEDICINE | Facility: CLINIC | Age: 68
End: 2023-10-31
Payer: COMMERCIAL

## 2023-10-31 DIAGNOSIS — U07.1 INFECTION DUE TO 2019 NOVEL CORONAVIRUS: Primary | ICD-10-CM

## 2023-10-31 DIAGNOSIS — E66.3 OVERWEIGHT (BMI 25.0-29.9): ICD-10-CM

## 2023-10-31 DIAGNOSIS — M79.7 FIBROMYALGIA: ICD-10-CM

## 2023-10-31 PROCEDURE — 99213 OFFICE O/P EST LOW 20 MIN: CPT | Mod: 95 | Performed by: FAMILY MEDICINE

## 2023-10-31 NOTE — PROGRESS NOTES
Printed AVS and mailing to the patient's home address.  Leilani Matt MA  [facility]   Primary Care

## 2023-10-31 NOTE — PROGRESS NOTES
Carito is a 68 year old who is being evaluated via a billable video visit.      How would you like to obtain your AVS? MyChart  If the video visit is dropped, the invitation should be resent by:   Will anyone else be joining your video visit? No          Assessment & Plan     (U07.1) Infection due to 2019 novel coronavirus  (primary encounter diagnosis)  Comment: Looks like mild disease right now.   Plan: nirmatrelvir and ritonavir (PAXLOVID) 300         mg/100 mg therapy pack        Return in about 1 week (around 11/7/2023) for recheck if symptoms fail to resolve by then, in the office, or sooner if symptoms worsen.  (Call)    (M79.7) Fibromyalgia  (E66.3) Overweight (BMI 25.0-29.9)  Comment: Her age and these diagnoses may increase her risk for severe infection. Her last recorded BMI was 29+, and it is possible that her BMI is 30+ by now.   Plan:       21 minutes spent by me on the date of the encounter doing chart review, review of test results, patient visit, and documentation     Koby Zimmerman MD  [facility]   Carito is a 68 year old, presenting for the following health issues:  Covid Concern      HPI       COVID-19 Symptom Review  How many days ago did these symptoms start? She reportedly got it from her  who tested positive (10/27). She first started noticing symptoms (10/28) and tested positive for COVID-19 (10/29). Her first symptom was sore throat and her current worst symptoms are head congestion and fatigue.     Are any of the following symptoms significant for you?  New or worsening difficulty breathing? No  Worsening cough? Yes, I am coughing.  Fever or chills? Yes, I felt feverish or had chills.  Headache: YES  Sore throat: YES  Chest pain: No, feels mild pressure  Diarrhea: No  Body aches? YES    What treatments has patient tried? Drinking lots of fluids, resting   Does patient live in a nursing home, group home, or shelter? No  Does patient have  a way to get food/medications during quarantined? Yes, I have a friend or family member who can help me.            Review of Systems         Objective           Vitals:  No vitals were obtained today due to virtual visit.    Physical Exam   GENERAL: Healthy, alert and no distress, sniffing intermittently  EYES: Eyes grossly normal to inspection.  No discharge or erythema, or obvious scleral/conjunctival abnormalities.  RESP: No audible wheeze, rare cough, or visible cyanosis.  No visible retractions or increased work of breathing.    SKIN: Visible skin clear. No significant rash, abnormal pigmentation or lesions.  NEURO: Cranial nerves grossly intact.  Mentation and speech appropriate for age.  PSYCH: Mentation appears normal, affect normal/bright, judgement and insight intact, normal speech and appearance well-groomed.       Latest Reference Range & Units 08/14/19 15:20   Creatinine 0.52 - 1.04 mg/dL 0.72   GFR Estimate >60 mL/min/1.73_m2 89             Video-Visit Details    Type of service:  Video Visit   Originating Location (pt. Location): Home  Total video time: 15 minutes  Distant Location (provider location):  On-site  Platform used for Video Visit: [facility]    This document serves as a record of the services and decisions personally performed and made by Dr. Zimmerman. It was created on his behalf by Daryl Ray, a trained medical scribe. The creation of this document is based the provider's statements to the medical scribe.  Daryl Ray,  10:43 AM

## 2023-12-26 ENCOUNTER — TELEPHONE (OUTPATIENT)
Dept: FAMILY MEDICINE | Facility: CLINIC | Age: 68
End: 2023-12-26
Payer: COMMERCIAL

## 2023-12-26 NOTE — TELEPHONE ENCOUNTER
Patient  came into clinic and dropped off health care directive [facility] at .

## 2024-01-04 ENCOUNTER — DOCUMENTATION ONLY (OUTPATIENT)
Dept: OTHER | Facility: CLINIC | Age: 69
End: 2024-01-04
Payer: COMMERCIAL

## 2024-01-04 ENCOUNTER — TELEPHONE (OUTPATIENT)
Dept: FAMILY MEDICINE | Facility: CLINIC | Age: 69
End: 2024-01-04
Payer: COMMERCIAL

## 2024-01-04 NOTE — TELEPHONE ENCOUNTER
What type of form? Legal - Health Care Directive  What day did you drop off your forms? 01/04/24  Is there a due date? NO (7-10 business day to compete forms)   How would you like to receive these forms? NA  Which clinic was the [facility] off at? [facility]    What is the best number to contact you? Cell [phone]  What time works best to contact you with in 4 hrs? NA  Is it okay to leave a message? Yes    Patient dropped off form for Chart/Medical Record.   Placed in Folder - [employer], .    Bonny Honeycutt

## 2024-01-24 ENCOUNTER — DOCUMENTATION ONLY (OUTPATIENT)
Dept: OTHER | Facility: CLINIC | Age: 69
End: 2024-01-24
Payer: COMMERCIAL

## 2024-03-21 ENCOUNTER — TELEPHONE (OUTPATIENT)
Dept: FAMILY MEDICINE | Facility: CLINIC | Age: 69
End: 2024-03-21
Payer: COMMERCIAL

## 2024-03-21 NOTE — TELEPHONE ENCOUNTER
Patient Quality Outreach    Patient is due for the following:   Colon Cancer Screening  Breast Cancer Screening - Mammogram  Physical Annual Wellness Visit      Topic Date Due    Zoster (Shingles) Vaccine (1 of 2) Never done    Pneumococcal Vaccine (1 of 1 - PCV) Never done       Next Steps:   Schedule a Annual Wellness Visit    Type of outreach:    Sent [address] message.    Next Steps:  Reach out within 90 days via Letter.    Max number of attempts reached: No. Will try again in 90 days if patient still on fail list.    Questions for provider review:    None           Darby Dinh CNA

## 2024-06-30 ENCOUNTER — HEALTH MAINTENANCE LETTER (OUTPATIENT)
Age: 69
End: 2024-06-30

## 2024-10-18 ENCOUNTER — TELEPHONE (OUTPATIENT)
Dept: FAMILY MEDICINE | Facility: CLINIC | Age: 69
End: 2024-10-18
Payer: COMMERCIAL

## 2024-10-18 NOTE — TELEPHONE ENCOUNTER
Patient Quality Outreach    Patient is due for the following:   Physical Annual Wellness Visit    Next Steps:   Schedule a Annual Wellness Visit    Type of outreach:    Sent [employer] message.    Next Steps:  Reach out within 90 days via [employer].    Max number of attempts reached: Yes. Will try again in 90 days if patient still on fail list.    Questions for provider review:    None           Jeanette Mendoza MA

## 2025-07-13 ENCOUNTER — HEALTH MAINTENANCE LETTER (OUTPATIENT)
Age: 70
End: 2025-07-13